# Patient Record
Sex: FEMALE | Race: WHITE | Employment: FULL TIME | URBAN - METROPOLITAN AREA
[De-identification: names, ages, dates, MRNs, and addresses within clinical notes are randomized per-mention and may not be internally consistent; named-entity substitution may affect disease eponyms.]

---

## 2017-07-10 ENCOUNTER — HOSPITAL ENCOUNTER (OUTPATIENT)
Dept: MAMMOGRAPHY | Age: 57
Discharge: HOME | End: 2017-07-10
Attending: OBSTETRICS & GYNECOLOGY
Payer: COMMERCIAL

## 2017-07-10 DIAGNOSIS — Z12.31 VISIT FOR SCREENING MAMMOGRAM: ICD-10-CM

## 2017-07-10 PROCEDURE — 77067 SCR MAMMO BI INCL CAD: CPT

## 2018-07-18 ENCOUNTER — HOSPITAL ENCOUNTER (OUTPATIENT)
Dept: MAMMOGRAPHY | Age: 58
Discharge: HOME | End: 2018-07-18
Attending: FAMILY MEDICINE
Payer: COMMERCIAL

## 2018-07-18 DIAGNOSIS — Z12.31 VISIT FOR SCREENING MAMMOGRAM: ICD-10-CM

## 2018-07-18 PROCEDURE — 77063 BREAST TOMOSYNTHESIS BI: CPT

## 2018-07-20 ENCOUNTER — HOSPITAL ENCOUNTER (OUTPATIENT)
Dept: MAMMOGRAPHY | Age: 58
Discharge: HOME | End: 2018-07-20
Attending: FAMILY MEDICINE
Payer: COMMERCIAL

## 2018-07-20 DIAGNOSIS — R92.8 ABNORMAL MAMMOGRAM OF LEFT BREAST: ICD-10-CM

## 2018-07-20 PROCEDURE — 77065 DX MAMMO INCL CAD UNI: CPT

## 2018-07-20 NOTE — PROGRESS NOTES
I spoke with Walter Jones at Dr Carvajal Many office, ok to do STBX here and she will fax order.    rickyw

## 2018-07-25 ENCOUNTER — HOSPITAL ENCOUNTER (OUTPATIENT)
Dept: MAMMOGRAPHY | Age: 58
Discharge: HOME | End: 2018-07-25
Attending: FAMILY MEDICINE
Payer: COMMERCIAL

## 2018-07-25 DIAGNOSIS — R92.1 BREAST CALCIFICATIONS ON MAMMOGRAM: ICD-10-CM

## 2018-07-25 PROCEDURE — 88305 TISSUE EXAM BY PATHOLOGIST: CPT | Performed by: FAMILY MEDICINE

## 2018-07-25 PROCEDURE — A4648 IMPLANTABLE TISSUE MARKER: HCPCS

## 2018-07-25 PROCEDURE — 19081 BX BREAST 1ST LESION STRTCTC: CPT

## 2018-07-25 PROCEDURE — 77065 DX MAMMO INCL CAD UNI: CPT

## 2018-07-25 PROCEDURE — 74011000250 HC RX REV CODE- 250: Performed by: RADIOLOGY

## 2018-07-25 RX ORDER — LIDOCAINE HYDROCHLORIDE AND EPINEPHRINE 10; 10 MG/ML; UG/ML
10 INJECTION, SOLUTION INFILTRATION; PERINEURAL ONCE
Status: COMPLETED | OUTPATIENT
Start: 2018-07-25 | End: 2018-07-25

## 2018-07-25 RX ORDER — LIDOCAINE HYDROCHLORIDE 10 MG/ML
15 INJECTION, SOLUTION EPIDURAL; INFILTRATION; INTRACAUDAL; PERINEURAL ONCE
Status: COMPLETED | OUTPATIENT
Start: 2018-07-25 | End: 2018-07-25

## 2018-07-25 RX ADMIN — LIDOCAINE HYDROCHLORIDE AND EPINEPHRINE 100 MG: 10; 10 INJECTION, SOLUTION INFILTRATION; PERINEURAL at 09:00

## 2018-07-25 RX ADMIN — LIDOCAINE HYDROCHLORIDE 15 ML: 10 INJECTION, SOLUTION EPIDURAL; INFILTRATION; INTRACAUDAL; PERINEURAL at 09:00

## 2018-07-25 NOTE — DISCHARGE INSTRUCTIONS
Breast Biopsy Discharge Instructions    PAIN CONTROL     You may have mild discomfort; take 1-2 Tylenol every 4-6 hours as needed.  Do not take aspirin containing products or anti-inflammatory medications (Advil, Aleve, Motrin, Ibuprofen, etc.) for 24 hours.  Wearing a comfortable bra for support may help with discomfort. WOUND CARE      A small amount of bleeding or bruising at the biopsy site is normal. Watch for signs and symptoms of infections: skin warm to touch, yellowish drainage from wound, fever or severe pain.  Place an ice pack over the site hourly, 20-30 at a time for a few hours today.  The clear dressing is water resistant (you may shower, but do not allow the dressing to become saturated). You may remove the dressing in 48 hours. The steri-strips (small pieces of tape covering the incision) will fall off on their own in a few days. If the clear dressing irritates your skin or begins to peel off, you may remove it. Remember, if you remove the clear dressing before 48 hours, you should not get the site wet.  If at any point you have EXCESSIVE BLEEDING (saturating the gauze under the clear dressing) OR pain please call:    Daytime 7:30am-5:00pm: [facility] [phone]    After Hours:    [phone] (ask to speak to a radiologist)              or [address] [place] [address] [phone]    ACTIVITY     Do not participate in any strenuous activities for 24 hours (exercise, sports, housework, etc.).  You may resume work (light duty only) for the first 24 hours.  No heavy lifting with the arm on the affected side of your body. ADDITIONAL INSTRUCTIONS    We will call you with results on Friday July 27 in the afternoon.        YOUR TREATMENT TEAM TODAY WAS    MD: Ashley Jonas  RN: Guillermo Oh  Radiology Tech: [facility]

## 2018-07-25 NOTE — PROGRESS NOTES
Patient tolerated left breast biopsy well with minimal bleeding. Biopsy site was bandaged in the usual fashion and discharge instructions were reviewed with the patient. Advised patient that pathology results should be available by Friday and that she will receive a phone call in the afternoon from our office. Encouraged patient to call with any questions or concerns.

## 2018-07-30 NOTE — PROGRESS NOTES
7/27/18 LEFT breast pathology results were approved by Dr. Lanette Munguia and given to the patient via phone. The results were called to Dr. Sarahi Duran and Dr. Leticia Britt and they recommend Dr. Ashley Prince as a surgical consult. Pt will see Dr. Ashley Prince on Wednesday 7/6/1791 @ 2:40pm. Dr. Bhaskar Good office stated the patient was responsible for getting insurance auth for her appt, pt was informed of this. Adv pt to call us if she has any questions, she reported no complications from the procedure.     Jennifer Hernandez, RT, R, M

## 2018-08-01 ENCOUNTER — DOCUMENTATION ONLY (OUTPATIENT)
Dept: SURGERY | Age: 58
End: 2018-08-01

## 2018-08-01 ENCOUNTER — OFFICE VISIT (OUTPATIENT)
Dept: SURGERY | Age: 58
End: 2018-08-01

## 2018-08-01 VITALS
WEIGHT: 141 LBS | HEART RATE: 76 BPM | BODY MASS INDEX: 28.43 KG/M2 | SYSTOLIC BLOOD PRESSURE: 153 MMHG | DIASTOLIC BLOOD PRESSURE: 81 MMHG | HEIGHT: 59 IN

## 2018-08-01 DIAGNOSIS — Z91.89 AT HIGH RISK FOR BREAST CANCER: ICD-10-CM

## 2018-08-01 DIAGNOSIS — N60.92 ATYPICAL DUCTAL HYPERPLASIA OF LEFT BREAST: Primary | ICD-10-CM

## 2018-08-01 RX ORDER — ESTRADIOL AND NORETHINDRONE ACETATE .5; .1 MG/1; MG/1
TABLET ORAL
Refills: 1 | COMMUNITY
Start: 2018-07-03 | End: 2021-01-06 | Stop reason: ALTCHOICE

## 2018-08-01 RX ORDER — ROSUVASTATIN CALCIUM 10 MG/1
TABLET, COATED ORAL
COMMUNITY
Start: 2018-07-29

## 2018-08-01 RX ORDER — LISINOPRIL AND HYDROCHLOROTHIAZIDE 20; 25 MG/1; MG/1
TABLET ORAL
Refills: 1 | COMMUNITY
Start: 2018-06-01

## 2018-08-01 NOTE — PROGRESS NOTES
I spk w/ Dr. Irene Flores about the surgical recommendation, she agrees with the patient seeing Dr. Debbie Lerma. I advised her the patient has an appt today to see him.

## 2018-08-01 NOTE — PROGRESS NOTES
HISTORY OF PRESENT ILLNESS Amina Plunkett is a 66 y.o. female. HPI    NEW patient presents for consultation at the request of Dr. Tessa Camp for new diagnosis of LEFT breast ADH diagnosed after calcifications were seen on her recent mammogram.  The patient is not having any breast symptoms, feels no breast lumps, has no nipple discharge/retraction or skin change. Has only had one call back in the past for an abnormal mammogram.  This was the patient's first biopsy. There is no FH of breast or ovarian cancer. 7/25/18:  Left breast, core biopsies Focal atypical ductal hyperplasia (ADH) Benign breast tissue with fibrocystic changes and associated microcalcifications Recent imaging has been at [address] [place] [address] Review of Systems Constitutional: Negative. HENT: Negative. Eyes: Negative. Respiratory: Negative. Cardiovascular: Negative. Gastrointestinal: Negative. Genitourinary: Negative. Musculoskeletal: Positive for joint pain. Skin: Negative. Neurological: Negative. Endo/Heme/Allergies: Negative. Psychiatric/Behavioral: Positive for depression. Physical Exam 
 
ASSESSMENT and PLAN 
{ASSESSMENT/PLAN:72571}

## 2018-08-01 NOTE — LETTER
8/1/2018 4:24 PM 
 
Patient:  Keiko Coleman YOB: 1960 Date of Visit: 8/1/2018 Dear Dr. Annmarie Johnson: Thank you for referring Ms. Keiko Coleman to me for evaluation/treatment. Below are the relevant portions of my assessment and plan of care. HISTORY OF PRESENT ILLNESS Keiko Coleman is a 66 y.o. female. HPI 
NEW patient presents for consultation at the request of Dr. Suzanna Puente for new diagnosis of LEFT breast ADH diagnosed after calcifications were seen on her recent mammogram.  The patient is not having any breast symptoms, feels no breast lumps, has no nipple discharge/retraction or skin change. Has only had one call back in the past for an abnormal mammogram.  This was the patient's first biopsy. There is no FH of breast or ovarian cancer. 7/25/18:  Left breast, core biopsies Focal atypical ductal hyperplasia (ADH) Benign breast tissue with fibrocystic changes and associated microcalcifications Recent imaging has been at [address] [place] [address] Past Medical History:  
Diagnosis Date  Hypertension Past Surgical History:  
Procedure Laterality Date  HX GYN Left Removal of fibroid tumor from ovary Social History Social History  Marital status:  Spouse name: N/A  
 Number of children: N/A  
 Years of education: N/A Occupational History  Not on file. Social History Main Topics  Smoking status: Former Smoker  Smokeless tobacco: Never Used  Alcohol use Yes  Drug use: No  
 Sexual activity: Not on file Other Topics Concern  Not on file Social History Narrative Current Outpatient Prescriptions on File Prior to Visit Medication Sig Dispense Refill  fluoxetine (PROZAC) 20 mg capsule Take 20 mg by mouth daily.  aspirin 81 mg chewable tablet Take 81 mg by mouth daily. No current facility-administered medications on file prior to visit. Allergies Allergen Reactions  Erythromycin Estolate Hives OB History Obstetric Comments Menarche 17, LMP 7/23/18, # of children 2, age of 4st delivery 41, Hysterectomy/oophorectomy No/No/, Breast bx Yes, LEFT, history of breast feeding Yes, BCP Yes, Hormone therapy Yes, currently ROS Constitutional: Negative. HENT: Negative. Eyes: Negative. Respiratory: Negative. Cardiovascular: Negative. Gastrointestinal: Negative. Genitourinary: Negative. Musculoskeletal: Positive for joint pain. Skin: Negative. Neurological: Negative. Endo/Heme/Allergies: Negative. Psychiatric/Behavioral: Positive for depression. Physical Exam  
Cardiovascular: Normal rate, regular rhythm and normal heart sounds. Pulmonary/Chest: Breath sounds normal. Right breast exhibits no inverted nipple, no mass, no nipple discharge, no skin change and no tenderness. Left breast exhibits no inverted nipple, no mass, no nipple discharge, no skin change and no tenderness. Breasts are symmetrical.  
 
 
Lymphadenopathy:  
     Right cervical: No superficial cervical, no deep cervical and no posterior cervical adenopathy present. Left cervical: No superficial cervical, no deep cervical and no posterior cervical adenopathy present. She has no axillary adenopathy. Right axillary: No pectoral and no lateral adenopathy present. Left axillary: No pectoral and no lateral adenopathy present. BREAST ULTRASOUND, Preop planning Indication:preop planning  left Breast upper outer quadrant Technique: The area was scanned using a high-frequency linear-array near-field transducer Findings: Bx clip visible Impression: ADH, per bx Disposition:  MRI 
 
ASSESSMENT and PLAN 
  ICD-10-CM ICD-9-CM 1. Atypical ductal hyperplasia of left breast N60.92 610.8 New pt presents for evaluation of LEFT breast ADH.  Reviewed images and discussed possibility of concomitant breast cancer. Discussed that ADH increases risk of breast cancer, and will qualify pt for enrollment in our high risk clinic. I recommend MRI, followed by possible excisional bx to confirm PATH pending MRI results. Also discussed continued observation with imaging. Discussed potential treatment for DCIS, including lumpectomy and possible XRT. Physical exam today normal. US visualizes bx clip at LEFT breast UOQ. Will order MRI and plan further form there. I will prescribe Xanax prior to MRI, as pt notes claustrophobia . Will f/u with MRI results. This plan was reviewed with the patient and patient agrees. All questions were answered. Written by Gino Officer, as dictated by Dr. Gini Viera MD. 
 
If you have questions, please do not hesitate to call me. I look forward to following Ms. DanielrickyEric along with you.  
 
 
 
Sincerely, 
 
 
Amarjit George MD

## 2018-08-01 NOTE — COMMUNICATION BODY
HISTORY OF PRESENT ILLNESS  Andreas Sprague is a 66 y.o. female. HPI  NEW patient presents for consultation at the request of Dr. Ryan Arce for new diagnosis of LEFT breast ADH diagnosed after calcifications were seen on her recent mammogram.  The patient is not having any breast symptoms, feels no breast lumps, has no nipple discharge/retraction or skin change. Has only had one call back in the past for an abnormal mammogram.  This was the patient's first biopsy. There is no FH of breast or ovarian cancer. 7/25/18:  Left breast, core biopsies   Focal atypical ductal hyperplasia (ADH)   Benign breast tissue with fibrocystic changes and associated microcalcifications     Recent imaging has been at [facility]    Past Medical History:   Diagnosis Date    Hypertension        Past Surgical History:   Procedure Laterality Date    HX GYN Left     Removal of fibroid tumor from ovary        Social History     Social History    Marital status:      Spouse name: N/A    Number of children: N/A    Years of education: N/A     Occupational History    Not on file. Social History Main Topics    Smoking status: Former Smoker    Smokeless tobacco: Never Used    Alcohol use Yes    Drug use: No    Sexual activity: Not on file     Other Topics Concern    Not on file     Social History Narrative       Current Outpatient Prescriptions on File Prior to Visit   Medication Sig Dispense Refill    fluoxetine (PROZAC) 20 mg capsule Take 20 mg by mouth daily.  aspirin 81 mg chewable tablet Take 81 mg by mouth daily. No current facility-administered medications on file prior to visit.         Allergies   Allergen Reactions    Erythromycin Estolate Hives       OB History     Obstetric Comments    Menarche 17, LMP 7/23/18, # of children 2, age of 4st delivery 41, Hysterectomy/oophorectomy No/No/, Breast bx Yes, LEFT, history of breast feeding Yes, BCP Yes, Hormone therapy Yes, currently          ROS  Constitutional: Negative. HENT: Negative. Eyes: Negative. Respiratory: Negative. Cardiovascular: Negative. Gastrointestinal: Negative. Genitourinary: Negative. Musculoskeletal: Positive for joint pain. Skin: Negative. Neurological: Negative. Endo/Heme/Allergies: Negative. Psychiatric/Behavioral: Positive for depression. Physical Exam   Cardiovascular: Normal rate, regular rhythm and normal heart sounds. Pulmonary/Chest: Breath sounds normal. Right breast exhibits no inverted nipple, no mass, no nipple discharge, no skin change and no tenderness. Left breast exhibits no inverted nipple, no mass, no nipple discharge, no skin change and no tenderness. Breasts are symmetrical.       Lymphadenopathy:        Right cervical: No superficial cervical, no deep cervical and no posterior cervical adenopathy present. Left cervical: No superficial cervical, no deep cervical and no posterior cervical adenopathy present. She has no axillary adenopathy. Right axillary: No pectoral and no lateral adenopathy present. Left axillary: No pectoral and no lateral adenopathy present. BREAST ULTRASOUND, Preop planning  Indication:preop planning  left Breast upper outer quadrant   Technique: The area was scanned using a high-frequency linear-array near-field transducer  Findings: Bx clip visible  Impression: ADH, per bx  Disposition:  MRI    ASSESSMENT and PLAN    ICD-10-CM ICD-9-CM    1. Atypical ductal hyperplasia of left breast N60.92 610.8       New pt presents for evaluation of LEFT breast ADH. Reviewed images and discussed possibility of concomitant breast cancer. Discussed that ADH increases risk of breast cancer, and will qualify pt for enrollment in our high risk clinic. I recommend MRI, followed by possible excisional bx to confirm PATH pending MRI results. Also discussed continued observation with imaging.  Discussed potential treatment for DCIS, including lumpectomy and possible XRT. Physical exam today normal. US visualizes bx clip at LEFT breast UOQ. Will order MRI and plan further form there. I will prescribe Xanax prior to MRI, as pt notes claustrophobia . Will f/u with MRI results. This plan was reviewed with the patient and patient agrees. All questions were answered.     Written by Emily Salazar, as dictated by Dr. Romie Garces MD.

## 2018-08-01 NOTE — PROGRESS NOTES
HISTORY OF PRESENT ILLNESS  Amina Plunkett is a 66 y.o. female. HPI  NEW patient presents for consultation at the request of Dr. Tessa Camp for new diagnosis of LEFT breast ADH diagnosed after calcifications were seen on her recent mammogram.  The patient is not having any breast symptoms, feels no breast lumps, has no nipple discharge/retraction or skin change. Has only had one call back in the past for an abnormal mammogram.  This was the patient's first biopsy. There is no FH of breast or ovarian cancer. 7/25/18:  Left breast, core biopsies   Focal atypical ductal hyperplasia (ADH)   Benign breast tissue with fibrocystic changes and associated microcalcifications     Recent imaging has been at [facility]    Past Medical History:   Diagnosis Date    Hypertension        Past Surgical History:   Procedure Laterality Date    HX GYN Left     Removal of fibroid tumor from ovary        Social History     Social History    Marital status:      Spouse name: N/A    Number of children: N/A    Years of education: N/A     Occupational History    Not on file. Social History Main Topics    Smoking status: Former Smoker    Smokeless tobacco: Never Used    Alcohol use Yes    Drug use: No    Sexual activity: Not on file     Other Topics Concern    Not on file     Social History Narrative       Current Outpatient Prescriptions on File Prior to Visit   Medication Sig Dispense Refill    fluoxetine (PROZAC) 20 mg capsule Take 20 mg by mouth daily.  aspirin 81 mg chewable tablet Take 81 mg by mouth daily. No current facility-administered medications on file prior to visit.         Allergies   Allergen Reactions    Erythromycin Estolate Hives       OB History     Obstetric Comments    Menarche 17, LMP 7/23/18, # of children 2, age of 4st delivery 41, Hysterectomy/oophorectomy No/No/, Breast bx Yes, LEFT, history of breast feeding Yes, BCP Yes, Hormone therapy Yes, currently          ROS  Constitutional: Negative. HENT: Negative. Eyes: Negative. Respiratory: Negative. Cardiovascular: Negative. Gastrointestinal: Negative. Genitourinary: Negative. Musculoskeletal: Positive for joint pain. Skin: Negative. Neurological: Negative. Endo/Heme/Allergies: Negative. Psychiatric/Behavioral: Positive for depression. Physical Exam   Cardiovascular: Normal rate, regular rhythm and normal heart sounds. Pulmonary/Chest: Breath sounds normal. Right breast exhibits no inverted nipple, no mass, no nipple discharge, no skin change and no tenderness. Left breast exhibits no inverted nipple, no mass, no nipple discharge, no skin change and no tenderness. Breasts are symmetrical.       Lymphadenopathy:        Right cervical: No superficial cervical, no deep cervical and no posterior cervical adenopathy present. Left cervical: No superficial cervical, no deep cervical and no posterior cervical adenopathy present. She has no axillary adenopathy. Right axillary: No pectoral and no lateral adenopathy present. Left axillary: No pectoral and no lateral adenopathy present. BREAST ULTRASOUND, Preop planning  Indication:preop planning  left Breast upper outer quadrant   Technique: The area was scanned using a high-frequency linear-array near-field transducer  Findings: Bx clip visible  Impression: ADH, per bx  Disposition:  MRI    ASSESSMENT and PLAN    ICD-10-CM ICD-9-CM    1. Atypical ductal hyperplasia of left breast N60.92 610.8       New pt presents for evaluation of LEFT breast ADH. Reviewed images and discussed possibility of concomitant breast cancer. Discussed that ADH increases risk of breast cancer, and will qualify pt for enrollment in our high risk clinic. I recommend MRI, followed by possible excisional bx to confirm PATH pending MRI results. Also discussed continued observation with imaging.  Discussed potential treatment for DCIS, including lumpectomy and possible XRT. Physical exam today normal. US visualizes bx clip at LEFT breast UOQ. Will order MRI and plan further form there. I will prescribe Xanax prior to MRI, as pt notes claustrophobia . Will f/u with MRI results. This plan was reviewed with the patient and patient agrees. All questions were answered.     Written by Shiloh Pereyra, as dictated by Dr. Shayla Billy MD.

## 2018-08-01 NOTE — PATIENT INSTRUCTIONS
Open Breast Biopsy: Before Your Surgery  What is an open breast biopsy? An open breast biopsy is surgery to take a sample of breast tissue. It may be done to check a lump found during a breast exam. Or it may be done to check an area of concern found on a mammogram or ultrasound. If the doctor can't feel the lump, a small wire can be put in the area during a mammogram or ultrasound just before surgery. The tip of the wire will guide the doctor to the area to be checked. The doctor will make a small cut in the breast to remove a piece of tissue. The cut is called an incision. If the lump or suspicious area is small, the doctor may be able to take out the entire lump or area. The doctor will close the incision with stitches. The breast tissue will be sent to a lab. There it will be examined under a microscope to check for breast cancer. Your doctor may get some answers right away. But it can take up to 1 to 2 weeks to get the final results. You will be able to go home on the same day as the biopsy. Most women are able to go back to work in 1 or 2 days. This depends on how you feel and the type of work you do. For 2 weeks after surgery, you will need to avoid bouncing and strenuous activities that involve the upper body. The surgery will leave a small scar on your breast that will fade with time. Less often, the surgery may leave a dent in the breast. You may be able to feel a hard area where the biopsy was done. This is a normal part of the healing process. It does not mean that the lump is growing back. The area will get softer in the weeks after surgery. Follow-up care is a key part of your treatment and safety. Be sure to make and go to all appointments, and call your doctor if you are having problems. It's also a good idea to know your test results and keep a list of the medicines you take. What happens before surgery?   Surgery can be stressful.  This information will help you understand what you can expect. And it will help you safely prepare for surgery.   Preparing for surgery    · Understand exactly what surgery is planned, along with the risks, benefits, and other options. · Tell your doctors ALL the medicines, vitamins, supplements, and herbal remedies you take. Some of these can increase the risk of bleeding or interact with anesthesia.     · If you take blood thinners, such as warfarin (Coumadin), clopidogrel (Plavix), or aspirin, be sure to talk to your doctor. He or she will tell you if you should stop taking these medicines before your surgery. Make sure that you understand exactly what your doctor wants you to do.     · Your doctor will tell you which medicines to take or stop before your surgery. You may need to stop taking certain medicines a week or more before surgery. So talk to your doctor as soon as you can.     · If you have an advance directive, let your doctor know. It may include a living will and a durable power of  for health care. Bring a copy to the hospital. If you don't have one, you may want to prepare one. It lets your doctor and loved ones know your health care wishes. Doctors advise that everyone prepare these papers before any type of surgery or procedure. What happens on the day of surgery? · Follow the instructions exactly about when to stop eating and drinking. If you don't, your surgery may be canceled. If your doctor told you to take your medicines on the day of surgery, take them with only a sip of water.     · Take a bath or shower before you come in for your surgery. Do not apply lotions, perfumes, deodorants, or nail polish.     · Do not shave the surgical site yourself.     · Take off all jewelry and piercings. And take out contact lenses, if you wear them.     · Bring a comfortable, supportive bra with you.  For the first 3 days after surgery, you will need to wear this all the time, even at night.    At the hospital or surgery center   · Bring a picture ID.     · The area for surgery is often marked to make sure there are no errors. If needed, you will get a mammogram or ultrasound to marjan the area.     · You will be kept comfortable and safe by your anesthesia provider. The anesthesia may make you sleep. Or it may just numb the area being worked on.     · The surgery will take about 1 hour. Going home   · Be sure you have someone to drive you home. Anesthesia and pain medicine make it unsafe for you to drive.     · You will be given more specific instructions about recovering from your surgery. They will cover things like diet, wound care, follow-up care, driving, and getting back to your normal routine. When should you call your doctor? · You have questions or concerns.     · You don't understand how to prepare for your surgery.     · You become ill before the surgery (such as fever, flu, or a cold).     · You need to reschedule or have changed your mind about having the surgery. Where can you learn more? Go to http://golden-isidoro.info/. Enter O436 in the search box to learn more about \"Open Breast Biopsy: Before Your Surgery. \"  Current as of: May 12, 2017  Content Version: 11.7  © 7481-1760 [employer]. Care instructions adapted under license by [employer] (which disclaims liability or warranty for this information). If you have questions about a medical condition or this instruction, always ask your healthcare professional. [address] any warranty or liability for your use of this information.

## 2018-08-01 NOTE — MR AVS SNAPSHOT
[address] [address] [address] [place] [address] 
[phone] Patient: Luz Mancuso MRN: [id number] ZLA:[id number] Visit Information Date & Time Provider Department Dept. Phone Encounter #  
 8/1/2018  3:56 PM Lawrence Yuan MD [address] at [facility] [phone] Upcoming Health Maintenance Date Due Hepatitis C Screening 1960 DTaP/Tdap/Td series (1 - Tdap) 1/18/1983 PAP AKA CERVICAL CYTOLOGY 1/18/1983 FOBT Q 1 YEAR AGE 50-75 1/18/2012 Influenza Age 5 to Adult 8/1/2018 BREAST CANCER SCRN MAMMOGRAM 7/20/2020 Allergies as of 8/1/2018  Review Complete On: 8/1/2018 By: Beatriz Cowan RN Severity Noted Reaction Type Reactions Erythromycin Estolate  12/23/2010    Hives Current Immunizations  Never Reviewed No immunizations on file. Not reviewed this visit Vitals BP Pulse Height(growth percentile) Weight(growth percentile) LMP BMI  
 153/81 76 4' 11\" (1.499 m) 141 lb (64 kg) 07/23/2018 28.48 kg/m2 OB Status Smoking Status Having regular periods Former Smoker Vitals History BMI and BSA Data Body Mass Index Body Surface Area  
 28.48 kg/m 2 1.63 m 2 Your Updated Medication List  
  
   
This list is accurate as of 8/1/18  3:36 PM.  Always use your most recent med list.  
  
  
  
  
 aspirin 81 mg chewable tablet Take 81 mg by mouth daily. estradiol-norethindrone 0.5-0.1 mg per tablet Commonly known as:  [place] Jack TAKE 1 TABLET BY MOUTH EVERY DAY FLUoxetine 20 mg capsule Commonly known as:  PROzac Take 20 mg by mouth daily. IRON PO Take  by mouth. KLOR-CON 10 PO Take  by mouth.  
  
 lisinopril-hydroCHLOROthiazide 20-25 mg per tablet Commonly known as:  PRINZIDE, ZESTORETIC  
TAKE 1 TABLET BY MOUTH EVERY DAY  
  
 rosuvastatin 10 mg tablet Commonly known as:  CRESTOR  
 Introducing [facility] & HEALTH SERVICES! [facility] introduces [employer] patient portal. Now you can access parts of your medical record, email your doctor's office, and request medication refills online. 1. In your internet browser, go to [email or web address] Vanilla [email or web address]/[employer] 2. Click on the First Time User? Click Here link in the Sign In box. You will see the New Member Sign Up page. 3. Enter your [employer] Access Code exactly as it appears below. You will not need to use this code after youve completed the sign-up process. If you do not sign up before the expiration date, you must request a new code. · [employer] Access Code: [email or web address] Expires: 9/19/2018  4:51 PM 
 
4. Enter the last four digits of your Social Security Number (xxxx) and Date of Birth (mm/dd/yyyy) as indicated and click Submit. You will be taken to the next sign-up page. 5. Create a [employer] ID. This will be your [employer] login ID and cannot be changed, so think of one that is secure and easy to remember. 6. Create a [employer] password. You can change your password at any time. 7. Enter your Password Reset Question and Answer. This can be used at a later time if you forget your password. 8. Enter your e-mail address. You will receive e-mail notification when new information is available in [address] 9. Click Sign Up. You can now view and download portions of your medical record. 10. Click the Download Summary menu link to download a portable copy of your medical information. If you have questions, please visit the Frequently Asked Questions section of the [employer] website. Remember, [employer] is NOT to be used for urgent needs. For medical emergencies, dial 911. Now available from your iPhone and Android! Please provide this summary of care documentation to your next provider. Your primary care clinician is listed as Marah Saxena. If you have any questions after today's visit, please call [phone].

## 2018-08-06 ENCOUNTER — TELEPHONE (OUTPATIENT)
Dept: SURGERY | Age: 58
End: 2018-08-06

## 2018-08-06 ENCOUNTER — DOCUMENTATION ONLY (OUTPATIENT)
Dept: SURGERY | Age: 58
End: 2018-08-06

## 2018-08-06 ENCOUNTER — HOSPITAL ENCOUNTER (OUTPATIENT)
Dept: MRI IMAGING | Age: 58
Discharge: HOME | End: 2018-08-06
Attending: SURGERY
Payer: COMMERCIAL

## 2018-08-06 VITALS — WEIGHT: 144 LBS | BODY MASS INDEX: 29.08 KG/M2

## 2018-08-06 DIAGNOSIS — F41.9 ANXIETY: Primary | ICD-10-CM

## 2018-08-06 DIAGNOSIS — N60.92 ATYPICAL DUCTAL HYPERPLASIA OF LEFT BREAST: ICD-10-CM

## 2018-08-06 DIAGNOSIS — Z91.89 AT HIGH RISK FOR BREAST CANCER: ICD-10-CM

## 2018-08-06 LAB — CREAT BLD-MCNC: 0.6 MG/DL (ref 0.6–1.3)

## 2018-08-06 PROCEDURE — 74011250636 HC RX REV CODE- 250/636: Performed by: SURGERY

## 2018-08-06 PROCEDURE — 82565 ASSAY OF CREATININE: CPT

## 2018-08-06 PROCEDURE — A9585 GADOBUTROL INJECTION: HCPCS | Performed by: SURGERY

## 2018-08-06 PROCEDURE — 77059 MRI BREAST BI W WO CONT: CPT

## 2018-08-06 RX ORDER — ALPRAZOLAM 0.5 MG/1
TABLET ORAL
Qty: 5 TAB | Refills: 0 | OUTPATIENT
Start: 2018-08-06 | End: 2019-02-13

## 2018-08-06 RX ADMIN — GADOBUTROL 6.5 ML: 604.72 INJECTION INTRAVENOUS at 14:00

## 2018-08-06 NOTE — PROGRESS NOTES
Authorization for breast MRI today (Dr. Real Snow did a peer to peer) is [id number]. I left this number for Arthur Bone and Lisa Montalvo at [address] [place].   Apparently the wrong diagnosis was given for the test.

## 2018-08-06 NOTE — TELEPHONE ENCOUNTER
L/M on nurse voicemail that she needed sedation for her breast MRI today. Also is asking about whether or not her insurance has approved the MRI for today. I called patient back. I let her know that Dr. Cheyenne Heller did perform a peer to peer today and was able to obtain an authorization for her breast MRI. I also told her I would call in Xanax 0.5 mg #[facility] at [phone]. I told her to take one pill 30-45 minutes prior to her procedure. She asked about when she would get results, and I let her know that results would probably be ready tomorrow. She was very appreciative of the return phone call.

## 2018-08-07 ENCOUNTER — TELEPHONE (OUTPATIENT)
Dept: SURGERY | Age: 58
End: 2018-08-07

## 2018-08-09 ENCOUNTER — TELEPHONE (OUTPATIENT)
Dept: SURGERY | Age: 58
End: 2018-08-09

## 2018-08-09 NOTE — TELEPHONE ENCOUNTER
The patient called wanting to make sure that waiting 6 months to re- visit her MRI findings was acceptable. I assured her that Dr. Jing Humphrey had written for a 6 month follow up after discussing with the patient her MRI results. She also questioned if she should have imaging done prior to the appointment and I told the patient I would check with Dio Vaughn, Dr. Johanny Massey nurse as to what specific breast imaging should be ordered? The patient was appreciative.

## 2018-08-22 ENCOUNTER — OFFICE VISIT (OUTPATIENT)
Dept: SURGERY | Age: 58
End: 2018-08-22

## 2018-08-22 DIAGNOSIS — Z91.89 AT HIGH RISK FOR BREAST CANCER: ICD-10-CM

## 2018-08-22 DIAGNOSIS — N60.92 ATYPICAL DUCTAL HYPERPLASIA OF LEFT BREAST: Primary | ICD-10-CM

## 2018-08-22 NOTE — PROGRESS NOTES
HISTORY OF PRESENT ILLNESS  Henrietta Monique is a 66 y.o. female. HPI   ESTABLISHED patient here today for follow up. She was recently diagnosed with LEFT breast ADH. She had her breast MRI on 8/6/18. She is here because she has a few questions regarding diagnosis and follow up. She has no new breast problems at this time. There is no FH of breast or ovarian cancer. FINAL PATHOLOGIC DIAGNOSIS on 7/25/18-  Left breast, core biopsies   Focal atypical ductal hyperplasia (ADH)   Benign breast tissue with fibrocystic changes and associated microcalcifications     Recent imaging-  8/6/18- Breast MRI   FINDINGS:  The breasts demonstrate scattered fibroglandular densities. There is moderate  background parenchymal enhancement. Scattered enhancing foci are seen in both  breasts, left greater than right.     There is a Hydromark biopsy clip at the 3:00 position of the left breast; there  is an associated thin rim of peripheral nonmasslike enhancement. No associated  mass is evident. No other dominant mass lesion or suspicious nonmasslike  enhancement is seen in either breast. There are no pathologically enlarged  axillary or internal mammary lymph nodes.     Incidental note is made of a subcentimeter cyst in the left hepatic lobe.     IMPRESSION:  BI-RADS Assessment Category 2: Benign finding. The thin rim of peripheral  nonmasslike enhancement at the biopsy site in the left breast likely represents  postbiopsy changes; no associated suspicious mass is seen. Surgical management  of the known ADH is recommended. The asymmetric scattered enhancing foci in both  breasts are benign in appearance; this examination will serve as a baseline. Follow-up breast MRI in one year is recommended with the patient's high-risk  status.     ROS    Physical Exam    ASSESSMENT and PLAN  {ASSESSMENT/PLAN:95360}

## 2018-08-22 NOTE — PROGRESS NOTES
HISTORY OF PRESENT ILLNESS  Sheri Sahu is a 66 y.o. female. HPI  ESTABLISHED patient here today for follow up. She was recently diagnosed with LEFT breast ADH. She had her breast MRI on 8/6/18. She is here because she has a few questions regarding diagnosis and follow up. She has no new breast problems at this time.      There is no FH of breast or ovarian cancer.     FINAL PATHOLOGIC DIAGNOSIS on 7/25/18-  Left breast, core biopsies   Focal atypical ductal hyperplasia (ADH)   Benign breast tissue with fibrocystic changes and associated microcalcifications      Recent imaging-  8/6/18- Breast MRI   FINDINGS:  The breasts demonstrate scattered fibroglandular densities. There is moderate  background parenchymal enhancement. Scattered enhancing foci are seen in both  breasts, left greater than right.      There is a Hydromark biopsy clip at the 3:00 position of the left breast; there  is an associated thin rim of peripheral nonmasslike enhancement. No associated  mass is evident. No other dominant mass lesion or suspicious nonmasslike  enhancement is seen in either breast. There are no pathologically enlarged  axillary or internal mammary lymph nodes.      Incidental note is made of a subcentimeter cyst in the left hepatic lobe.      IMPRESSION:  BI-RADS Assessment Category 2: Benign finding. The thin rim of peripheral  nonmasslike enhancement at the biopsy site in the left breast likely represents  postbiopsy changes; no associated suspicious mass is seen. Surgical management  of the known ADH is recommended. The asymmetric scattered enhancing foci in both  breasts are benign in appearance; this examination will serve as a baseline. Follow-up breast MRI in one year is recommended with the patient's high-risk  Status.     Past Medical History:   Diagnosis Date    Hypertension        Past Surgical History:   Procedure Laterality Date    HX GYN Left     Removal of fibroid tumor from ovary        Social History     Social History    Marital status:      Spouse name: N/A    Number of children: N/A    Years of education: N/A     Occupational History    Not on file. Social History Main Topics    Smoking status: Former Smoker    Smokeless tobacco: Never Used    Alcohol use Yes    Drug use: No    Sexual activity: Not on file     Other Topics Concern    Not on file     Social History Narrative       Current Outpatient Prescriptions on File Prior to Visit   Medication Sig Dispense Refill    ALPRAZolam (XANAX) 0.5 mg tablet Take one tab 30-45 mins prior to procedure. 5 Tab 0    estradiol-norethindrone (ACTIVELLA) 0.5-0.1 mg per tablet TAKE 1 TABLET BY MOUTH EVERY DAY  1    lisinopril-hydroCHLOROthiazide (PRINZIDE, ZESTORETIC) 20-25 mg per tablet TAKE 1 TABLET BY MOUTH EVERY DAY  1    rosuvastatin (CRESTOR) 10 mg tablet       potassium chloride (KLOR-CON 10 PO) Take  by mouth.  ferrous sulfate (IRON PO) Take  by mouth.  fluoxetine (PROZAC) 20 mg capsule Take 20 mg by mouth daily.  aspirin 81 mg chewable tablet Take 81 mg by mouth daily. No current facility-administered medications on file prior to visit. Allergies   Allergen Reactions    Erythromycin Estolate Hives       OB History     Obstetric Comments    Menarche 17, LMP 7/23/18, # of children 2, age of 4st delivery 41, Hysterectomy/oophorectomy No/No/, Breast bx Yes, LEFT, history of breast feeding Yes, BCP Yes, Hormone therapy Yes, currently          ROS    Physical Exam     ASSESSMENT and PLAN    ICD-10-CM ICD-9-CM    1. Atypical ductal hyperplasia of left breast N60.92 610.8    2. At high risk for breast cancer Z91.89 V49.89       Pt with LEFT breast ADH presents for f/u after breast MRI, and is doing well overall. She is anxious about possible surgery, and has many questions to discuss today.  Reviewed MRI results, which demonstrated normal enhancement, with bx clip visible in bx cavity, with thin rim of enhancement around bx cavity. Reviewed that this is normal. No mass or malignant type enhancement around clip. Further observation would not be unwarranted if pt is opposed to surgery. However, we also discussed that although MRI does not suggest any malignant findings, the safest thing to do would be excision, to be sure that there is not a concomitant breast cancer at bx site. Pt notes that she is anxious about surgery and anesthesia - discussed that we could use sedation rather than GA. Reviewed that excisional bx would entail sedation, and removal of small area of tissue around bx clip. Assured pt that excisional bx is a low risk procedure, and will verify PATH. Pt notes that her mother had liposarcoma. Reviewed that these are rare and are not genetic, and pt is at low risk for this despite her mother's hx. I will place order for excisional bx, and note that pt may call to schedule. Advised pt to have LEFT diagnostic mammo in 6 months, and also encouraged her to begin routine colonoscopies, as she notes she has not had one yet. This plan was reviewed with the patient and patient agrees. All questions were answered.     Written by Doe Calvo, as dictated by Dr. Secundino Saint, MD.

## 2018-08-22 NOTE — MR AVS SNAPSHOT
[address] [address] [address] [place] [address] 
[phone] Patient: Darleen Aldana MRN: [id number] EFN:[id number] Visit Information Date & Time Provider Department Dept. Phone Encounter #  
 8/22/2018  7:25 AM Alicia Montoya MD [address] at [address] [place] [address] [id number] Your Appointments 2/6/2019  1:30 PM  
Follow Up with MD [address] at [facility] Appt Note: 6 month follow up/ CP? 8/9/18 [address]  
  
   
 [place] [address] Upcoming Health Maintenance Date Due Hepatitis C Screening 1960 DTaP/Tdap/Td series (1 - Tdap) 1/18/1983 PAP AKA CERVICAL CYTOLOGY 1/18/1983 FOBT Q 1 YEAR AGE 50-75 1/18/2012 Influenza Age 5 to Adult 8/1/2018 BREAST CANCER SCRN MAMMOGRAM 7/20/2020 Allergies as of 8/22/2018  Review Complete On: 8/22/2018 By: Tali Almanza RN Severity Noted Reaction Type Reactions Erythromycin Estolate  12/23/2010    Hives Current Immunizations  Never Reviewed No immunizations on file. Not reviewed this visit You Were Diagnosed With   
  
 Codes Comments Atypical ductal hyperplasia of left breast    -  Primary ICD-10-CM: N60.92 
ICD-9-CM: 610.8 At high risk for breast cancer     ICD-10-CM: Z91.89 ICD-9-CM: V49.89 Vitals LMP OB Status Smoking Status 07/23/2018 Having regular periods Former Smoker Preferred Pharmacy Pharmacy Name Phone [id number] [place] [address] [phone] Your Updated Medication List  
  
   
This list is accurate as of 8/22/18 12:22 PM.  Always use your most recent med list.  
  
  
  
  
 ALPRAZolam 0.5 mg tablet Commonly known as:  Mariama [place] Take one tab 30-45 mins prior to procedure. aspirin 81 mg chewable tablet Take 81 mg by mouth daily. estradiol-norethindrone 0.5-0.1 mg per tablet Commonly known as:  [place]l New TAKE 1 TABLET BY MOUTH EVERY DAY FLUoxetine 20 mg capsule Commonly known as:  PROzac Take 20 mg by mouth daily. IRON PO Take  by mouth. KLOR-CON 10 PO Take  by mouth.  
  
 lisinopril-hydroCHLOROthiazide 20-25 mg per tablet Commonly known as:  PRINZIDE, ZESTORETIC  
TAKE 1 TABLET BY MOUTH EVERY DAY  
  
 rosuvastatin 10 mg tablet Commonly known as:  CRESTOR Patient Instructions Breast Self-Exam: Care Instructions Your Care Instructions A breast self-exam is when you check your breasts for lumps or changes. This regular exam helps you learn how your breasts normally look and feel. Most breast problems or changes are not because of cancer. Breast self-exam is not a substitute for a mammogram. Having regular breast exams by your doctor and regular mammograms improve your chances of finding any problems with your breasts. Some women set a time each month to do a step-by-step breast self-exam. Other women like a less formal system. They might look at their breasts as they brush their teeth, or feel their breasts once in a while in the shower. If you notice a change in your breast, tell your doctor. Follow-up care is a key part of your treatment and safety. Be sure to make and go to all appointments, and call your doctor if you are having problems. It's also a good idea to know your test results and keep a list of the medicines you take. How do you do a breast self-exam? 
· The best time to examine your breasts is usually one week after your menstrual period begins. Your breasts should not be tender then. If you do not have periods, you might do your exam on a day of the month that is easy to remember. · To examine your breasts: ¨ Remove all your clothes above the waist and lie down. When you are lying down, your breast tissue spreads evenly over your chest wall, which makes it easier to feel all your breast tissue. ¨ Use the pads-not the fingertips-of the 3 middle fingers of your left hand to check your right breast. Move your fingers slowly in small coin-sized circles that overlap. ¨ Use three levels of pressure to feel of all your breast tissue. Use light pressure to feel the tissue close to the skin surface. Use medium pressure to feel a little deeper. Use firm pressure to feel your tissue close to your breastbone and ribs. Use each pressure level to feel your breast tissue before moving on to the next spot. ¨ Check your entire breast, moving up and down as if following a strip from the collarbone to the bra line, and from the armpit to the ribs. Repeat until you have covered the entire breast. 
¨ Repeat this procedure for your left breast, using the pads of the 3 middle fingers of your right hand. · To examine your breasts while in the shower: 
¨ Place one arm over your head and lightly soap your breast on that side. ¨ Using the pads of your fingers, gently move your hand over your breast (in the strip pattern described above), feeling carefully for any lumps or changes. ¨ Repeat for the other breast. 
· Have your doctor inspect anything you notice to see if you need further testing. Where can you learn more? Go to http://golden-isidoro.info/. Enter P148 in the search box to learn more about \"Breast Self-Exam: Care Instructions. \" Current as of: May 12, 2017 Content Version: 11.7 © 5433-8848 [employer]. Care instructions adapted under license by [employer] (which disclaims liability or warranty for this information).  If you have questions about a medical condition or this instruction, always ask your healthcare professional. [employer] disclaims any warranty or liability for your use of this information. Introducing [facility] & HE[facility] SERVICES! [employer] introduces [employer] patient portal. Now you can access parts of your medical record, email your doctor's office, and request medication refills online. 1. In your internet browser, go to [email or web address] 2. Click on the First Time User? Click Here link in the Sign In box. You will see the New Member Sign Up page. 3. Enter your [employer] Access Code exactly as it appears below. You will not need to use this code after youve completed the sign-up process. If you do not sign up before the expiration date, you must request a new code. · [employer] Access Code: [email or web address] Expires: 9/19/2018  4:51 PM 
 
4. Enter the last four digits of your Social Security Number (xxxx) and Date of Birth (mm/dd/yyyy) as indicated and click Submit. You will be taken to the next sign-up page. 5. Create a [employer] ID. This will be your [employer] login ID and cannot be changed, so think of one that is secure and easy to remember. 6. Create a [employer] password. You can change your password at any time. 7. Enter your Password Reset Question and Answer. This can be used at a later time if you forget your password. 8. Enter your e-mail address. You will receive e-mail notification when new information is available in [address] 9. Click Sign Up. You can now view and download portions of your medical record. 10. Click the Download Summary menu link to download a portable copy of your medical information. If you have questions, please visit the Frequently Asked Questions section of the [employer] website. Remember, [employer] is NOT to be used for urgent needs. For medical emergencies, dial 911. Now available from your iPhone and Android! Please provide this summary of care documentation to your next provider. Your primary care clinician is listed as Donald Colder. If you have any questions after today's visit, please call [phone].

## 2018-08-22 NOTE — PATIENT INSTRUCTIONS
Breast Self-Exam: Care Instructions  Your Care Instructions    A breast self-exam is when you check your breasts for lumps or changes. This regular exam helps you learn how your breasts normally look and feel. Most breast problems or changes are not because of cancer. Breast self-exam is not a substitute for a mammogram. Having regular breast exams by your doctor and regular mammograms improve your chances of finding any problems with your breasts. Some women set a time each month to do a step-by-step breast self-exam. Other women like a less formal system. They might look at their breasts as they brush their teeth, or feel their breasts once in a while in the shower. If you notice a change in your breast, tell your doctor. Follow-up care is a key part of your treatment and safety. Be sure to make and go to all appointments, and call your doctor if you are having problems. It's also a good idea to know your test results and keep a list of the medicines you take. How do you do a breast self-exam?  · The best time to examine your breasts is usually one week after your menstrual period begins. Your breasts should not be tender then. If you do not have periods, you might do your exam on a day of the month that is easy to remember. · To examine your breasts:  ¨ Remove all your clothes above the waist and lie down. When you are lying down, your breast tissue spreads evenly over your chest wall, which makes it easier to feel all your breast tissue. ¨ Use the pads-not the fingertips-of the 3 middle fingers of your left hand to check your right breast. Move your fingers slowly in small coin-sized circles that overlap. ¨ Use three levels of pressure to feel of all your breast tissue. Use light pressure to feel the tissue close to the skin surface. Use medium pressure to feel a little deeper. Use firm pressure to feel your tissue close to your breastbone and ribs.  Use each pressure level to feel your breast tissue before moving on to the next spot. ¨ Check your entire breast, moving up and down as if following a strip from the collarbone to the bra line, and from the armpit to the ribs. Repeat until you have covered the entire breast.  ¨ Repeat this procedure for your left breast, using the pads of the 3 middle fingers of your right hand. · To examine your breasts while in the shower:  ¨ Place one arm over your head and lightly soap your breast on that side. ¨ Using the pads of your fingers, gently move your hand over your breast (in the strip pattern described above), feeling carefully for any lumps or changes. ¨ Repeat for the other breast.  · Have your doctor inspect anything you notice to see if you need further testing. Where can you learn more? Go to http://golden-isidoro.info/. Enter P148 in the search box to learn more about \"Breast Self-Exam: Care Instructions. \"  Current as of: May 12, 2017  Content Version: 11.7  © 3539-7719 [employer]. Care instructions adapted under license by "[employer] (which disclaims liability or warranty for this information). If you have questions about a medical condition or this instruction, always ask your healthcare professional. [address] [address] any warranty or liability for your use of this information.

## 2018-11-30 ENCOUNTER — APPOINTMENT (OUTPATIENT)
Dept: GENERAL RADIOLOGY | Age: 58
End: 2018-11-30
Attending: PHYSICIAN ASSISTANT
Payer: COMMERCIAL

## 2018-11-30 ENCOUNTER — APPOINTMENT (OUTPATIENT)
Dept: CT IMAGING | Age: 58
End: 2018-11-30
Attending: PHYSICIAN ASSISTANT
Payer: COMMERCIAL

## 2018-11-30 ENCOUNTER — HOSPITAL ENCOUNTER (EMERGENCY)
Age: 58
Discharge: HOME | End: 2018-11-30
Attending: STUDENT IN AN ORGANIZED HEALTH CARE EDUCATION/TRAINING PROGRAM | Admitting: STUDENT IN AN ORGANIZED HEALTH CARE EDUCATION/TRAINING PROGRAM
Payer: COMMERCIAL

## 2018-11-30 VITALS
SYSTOLIC BLOOD PRESSURE: 160 MMHG | OXYGEN SATURATION: 99 % | RESPIRATION RATE: 18 BRPM | HEART RATE: 68 BPM | TEMPERATURE: 98 F | DIASTOLIC BLOOD PRESSURE: 92 MMHG
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
## 2018-11-30 DIAGNOSIS — M79.602 ARM PAIN, LEFT: ICD-10-CM

## 2018-11-30 DIAGNOSIS — S09.90XA INJURY OF HEAD, INITIAL ENCOUNTER: ICD-10-CM

## 2018-11-30 PROCEDURE — 75810000053 HC SPLINT APPLICATION

## 2018-11-30 PROCEDURE — 73070 X-RAY EXAM OF ELBOW: CPT

## 2018-11-30 PROCEDURE — 99283 EMERGENCY DEPT VISIT LOW MDM: CPT

## 2018-11-30 PROCEDURE — 70450 CT HEAD/BRAIN W/O DYE: CPT

## 2018-11-30 PROCEDURE — 74011250637 HC RX REV CODE- 250/637: Performed by: PHYSICIAN ASSISTANT

## 2018-11-30 RX ORDER — OXYCODONE AND ACETAMINOPHEN 5; 325 MG/1; MG/1
2 TABLET ORAL
Status: COMPLETED | OUTPATIENT
Start: 2018-11-30 | End: 2018-11-30

## 2018-11-30 RX ORDER — IBUPROFEN 600 MG/1
600 TABLET ORAL
Status: COMPLETED | OUTPATIENT
Start: 2018-11-30 | End: 2018-11-30

## 2018-11-30 RX ORDER — OXYCODONE AND ACETAMINOPHEN 5; 325 MG/1; MG/1
1-2 TABLET ORAL
Qty: 10 TAB | Refills: 0 | Status: SHIPPED | OUTPATIENT
Start: 2018-11-30 | End: 2019-02-13 | Stop reason: ALTCHOICE

## 2018-11-30 RX ORDER — IBUPROFEN 600 MG/1
600 TABLET ORAL
Qty: 20 TAB | Refills: 0 | Status: SHIPPED | OUTPATIENT
Start: 2018-11-30 | End: 2019-08-20

## 2018-11-30 RX ADMIN — OXYCODONE AND ACETAMINOPHEN 2 TABLET: 5; 325 TABLET ORAL at 19:02

## 2018-11-30 RX ADMIN — IBUPROFEN 600 MG: 600 TABLET ORAL at 19:02

## 2018-11-30 NOTE — ED PROVIDER NOTES
66 y.o. R-handed female with past medical history significant for HTN and HLD who presents ambulatory to the ED, accompanied by , s/p fall. Pt states that around 1630 she was on a table taking posters off the wall when she stepped down onto a chair; the chair moved out from underneath her causing her to fall onto the ground, hitting the back of her head on the concrete floor and her L arm on the table on the way down. She notes that she had an \"instant headache\" after her head hit the floor; her L forearm has been swelling persistently, prompting her to visit [facility] for evaluation. Pt states that [facility] was \"unable to help her\", and advised her to go to the ED for further evaluation. She rates her L elbow pain a 7/10. Pt denies LOC, CP and SOB. There are no other acute medical concerns at this time. Negative Tobacco use (former smoker); Positive EtOH use; Negative Illicit Drug Abuse PCP: Cheryl Beltre MD 
 
Note written by Brianna Washington, as dictated by Ritu Watt PA-C 6:57 PM 
 
 
 
The history is provided by the patient and medical records. No  was used. Past Medical History:  
Diagnosis Date  Hypertension Past Surgical History:  
Procedure Laterality Date  HX GYN Left Removal of fibroid tumor from ovary No family history on file. Social History Socioeconomic History  Marital status:  Spouse name: Not on file  Number of children: Not on file  Years of education: Not on file  Highest education level: Not on file Social Needs  Financial resource strain: Not on file  Food insecurity - worry: Not on file  Food insecurity - inability: Not on file  Transportation needs - medical: Not on file  Transportation needs - non-medical: Not on file Occupational History  Not on file Tobacco Use  Smoking status: Former Smoker  Smokeless tobacco: Never Used Substance and Sexual Activity  Alcohol use: Yes  Drug use: No  
 Sexual activity: Not on file Other Topics Concern  Not on file Social History Narrative  Not on file ALLERGIES: Erythromycin estolate Review of Systems Constitutional: Negative. Negative for chills, fatigue and fever. HENT: Negative. Negative for congestion, ear pain, facial swelling, rhinorrhea, sneezing and sore throat. Eyes: Negative for pain, discharge and itching. Respiratory: Negative for cough, chest tightness and shortness of breath. Cardiovascular: Negative. Negative for chest pain and leg swelling. Gastrointestinal: Negative. Negative for abdominal distention, abdominal pain, constipation, diarrhea, nausea and vomiting. Genitourinary: Negative for difficulty urinating, frequency and urgency. Musculoskeletal: Positive for arthralgias, joint swelling and myalgias. Negative for back pain, neck pain and neck stiffness. Skin: Negative for color change and rash. Neurological: Positive for headaches. Negative for dizziness, syncope and numbness. Psychiatric/Behavioral: Negative for confusion and decreased concentration. All other systems reviewed and are negative. Vitals:  
 11/30/18 1753 11/30/18 1858 BP:  (!) 189/95 Pulse: 80 65 Resp:  18 Temp:  97.9 °F (36.6 °C) SpO2: 98% 98% Physical Exam  
Constitutional: She is oriented to person, place, and time. She appears well-developed and well-nourished. No distress. Tearful  female in NAD but appears to be in pain HENT:  
Head: Normocephalic and atraumatic. Right Ear: External ear normal. No hemotympanum. Left Ear: External ear normal. No hemotympanum. Nose: Nose normal.  
Mouth/Throat: Oropharynx is clear and moist. No oropharyngeal exudate. Eyes: Conjunctivae and EOM are normal. Pupils are equal, round, and reactive to light. Right eye exhibits no discharge.  Left eye exhibits no discharge. No scleral icterus. Neck: Normal range of motion. Neck supple. Cardiovascular: Regular rhythm. Exam reveals no gallop and no friction rub. No murmur heard. Pulmonary/Chest: Effort normal and breath sounds normal. She has no wheezes. She has no rales. Abdominal: Soft. Bowel sounds are normal. She exhibits no distension. There is no tenderness. There is no rebound and no guarding. Musculoskeletal:  
     Arms: 
Neurological: She is alert and oriented to person, place, and time. No cranial nerve deficit. Coordination normal.  
Skin: Skin is warm and dry. She is not diaphoretic. Psychiatric: She has a normal mood and affect. Her behavior is normal.  
Nursing note and vitals reviewed. MDM Number of Diagnoses or Management Options Arm pain, left:  
Fall, initial encounter:  
Injury of head, initial encounter:  
Diagnosis management comments: 67 yo  female with mechanical fall and resultant head injury and left arm injury. Concern for forearm fracture. N/V intact. Doubt ICH Plan Xray left elbow Ct head Analgesia Scott Montaño Amount and/or Complexity of Data Reviewed Tests in the radiology section of CPT®: ordered and reviewed Independent visualization of images, tracings, or specimens: yes Procedures Progress note Imaging reviewed. No fracture noted. Sig swelling and pain. Concern for occult fracture. Will splint with ortho follow-up. Scott Thakur Splint applied by nursing and evaluated by Scott Montaño. Neurovascular status intact post splint application. Desired position maintained. 9:23 PM 
Patient's results have been reviewed with them.   Patient and/or family have verbally conveyed their understanding and agreement of the patient's signs, symptoms, diagnosis, treatment and prognosis and additionally agree to follow up as recommended or return to the Emergency Room should their condition change prior to follow-up. Discharge instructions have also been provided to the patient with some educational information regarding their diagnosis as well a list of reasons why they would want to return to the ER prior to their follow-up appointment should their condition change.

## 2018-11-30 NOTE — ED TRIAGE NOTES
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 She was standing on a chair and the chair slipped from under her. Pt has swollen R elbow, reports hitting head, denies LOC. Pt acting appropriate in triage

## 2018-12-01 NOTE — DISCHARGE INSTRUCTIONS
Follow-up with orthopedist  Apply ice  Elevated the arm  Ibuprofen 600 mg every 6 hrs as needed for pain  Percocet 1-2 tabs every 6 hrs as needed for increased pain  Exercise caution medication may cause sedation  Return for any new or worsening. Sofy TobiasWilfredo, [place]       Preventing Falls: Care Instructions  Your Care Instructions    Getting around your home safely can be a challenge if you have injuries or health problems that make it easy for you to fall. Loose rugs and furniture in walkways are among the dangers for many older people who have problems walking or who have poor eyesight. People who have conditions such as arthritis, osteoporosis, or dementia also have to be careful not to fall. You can make your home safer with a few simple measures. Follow-up care is a key part of your treatment and safety. Be sure to make and go to all appointments, and call your doctor if you are having problems. It's also a good idea to know your test results and keep a list of the medicines you take. How can you care for yourself at home? Taking care of yourself  · You may get dizzy if you do not drink enough water. To prevent dehydration, drink plenty of fluids, enough so that your urine is light yellow or clear like water. Choose water and other caffeine-free clear liquids. If you have kidney, heart, or liver disease and have to limit fluids, talk with your doctor before you increase the amount of fluids you drink. · Exercise regularly to improve your strength, muscle tone, and balance. Walk if you can. Swimming may be a good choice if you cannot walk easily. · Have your vision and hearing checked each year or any time you notice a change. If you have trouble seeing and hearing, you might not be able to avoid objects and could lose your balance. · Know the side effects of the medicines you take. Ask your doctor or pharmacist whether the medicines you take can affect your balance.  Sleeping pills or sedatives can affect your balance. · Limit the amount of alcohol you drink. Alcohol can impair your balance and other senses. · Ask your doctor whether calluses or corns on your feet need to be removed. If you wear loose-fitting shoes because of calluses or corns, you can lose your balance and fall. · Talk to your doctor if you have numbness in your feet. Preventing falls at home  · Remove raised doorway thresholds, throw rugs, and clutter. Repair loose carpet or raised areas in the floor. · Move furniture and electrical cords to keep them out of walking paths. · Use nonskid floor wax, and wipe up spills right away, especially on ceramic tile floors. · If you use a walker or cane, put rubber tips on it. If you use crutches, clean the bottoms of them regularly with an abrasive pad, such as steel wool. · Keep your house well lit, especially [place], and outside walkways. Use night-lights in areas such as hallways and bathrooms. Add extra light switches or use remote switches (such as switches that go on or off when you clap your hands) to make it easier to turn lights on if you have to get up during the night. · Install sturdy handrails on stairways. · Move items in your cabinets so that the things you use a lot are on the lower shelves (about waist level). · Keep a cordless phone and a flashlight with new batteries by your bed. If possible, put a phone in each of the main rooms of your house, or carry a cell phone in case you fall and cannot reach a phone. Or, you can wear a device around your neck or wrist. You push a button that sends a signal for help. · Wear low-heeled shoes that fit well and give your feet good support. Use footwear with nonskid soles. Check the heels and soles of your shoes for wear. Repair or replace worn heels or soles. · Do not wear socks without shoes on wood floors. · Walk on the grass when the sidewalks are slippery.  If you live in an area that gets snow and ice in the winter, sprinkle salt on slippery steps and sidewalks. Preventing falls in the bath  · Install grab bars and nonskid mats inside and outside your shower or tub and near the toilet and sinks. · Use shower chairs and bath benches. · Use a hand-held shower head that will allow you to sit while showering. · Get into a tub or shower by putting the weaker leg in first. Get out of a tub or shower with your strong side first.  · Repair loose toilet seats and consider installing a raised toilet seat to make getting on and off the toilet easier. · Keep your bathroom door unlocked while you are in the shower. Where can you learn more? Go to http://goldenDossierViewisidoro.info/. Enter 0476 [id number] 69 [id number] in the search box to learn more about \"Preventing Falls: Care Instructions. \"  Current as of: March 16, 2018  Content Version: 11.8  © 8347-7707 [employer]. Care instructions adapted under license by [employer] (which disclaims liability or warranty for this information). If you have questions about a medical condition or this instruction, always ask your healthcare professional. [place] [address] any warranty or liability for your use of this information. Learning About a Closed Head Injury  What is a closed head injury? A closed head injury happens when your head gets hit hard. The strong force of the blow causes your brain to shake in your skull. This movement can cause the brain to bruise, swell, or tear. Sometimes nerves or blood vessels also get damaged. This can cause bleeding in or around the brain. A concussion is a type of closed head injury. What are the symptoms? If you have a mild concussion, you may have a mild headache or feel \"not quite right. \" These symptoms are common. They usually go away over a few days to 4 weeks. But sometimes after a concussion, you feel like you can't function as well as before the injury. And you have new symptoms.  This is called postconcussive syndrome. You may:  · Find it harder to solve problems, think, concentrate, or remember. · Have headaches. · Have changes in your sleep patterns, such as not being able to sleep or sleeping all the time. · Have changes in your personality. · Not be interested in your usual activities. · Feel angry or anxious without a clear reason. · Lose your sense of taste or smell. · Be dizzy, lightheaded, or unsteady. It may be hard to stand or walk. How is a closed head injury treated? Any person who may have a concussion needs to see a doctor. Some people have to stay in the hospital to be watched. Others can go home safely. If you go home, follow your doctor's instructions. He or she will tell you if you need someone to watch you closely for the next 24 hours or longer. Rest is the best treatment. Get plenty of sleep at night. And try to rest during the day. · Avoid activities that are physically or mentally demanding. These include housework, exercise, and schoolwork. And don't play video games, send text messages, or use the computer. You may need to change your school or work schedule to be able to avoid these activities. · Ask your doctor when it's okay to drive, ride a bike, or operate machinery. · Take an over-the-counter pain medicine, such as acetaminophen (Tylenol), ibuprofen (Advil, Motrin), or naproxen (Aleve). Be safe with medicines. Read and follow all instructions on the label. · Check with your doctor before you use any other medicines for pain. · Do not drink alcohol or use illegal drugs. They can slow recovery. They can also increase your risk of getting a second head injury. Follow-up care is a key part of your treatment and safety. Be sure to make and go to all appointments, and call your doctor if you are having problems. It's also a good idea to know your test results and keep a list of the medicines you take. Where can you learn more?   Go to http://terra.info/. Enter E235 in the search box to learn more about \"Learning About a Closed Head Injury. \"  Current as of: June 4, 2018  Content Version: 11.8  © 5612-8397 [employer]. Care instructions adapted under license by [employer] (which disclaims liability or warranty for this information). If you have questions about a medical condition or this instruction, always ask your healthcare professional. [place] [address] any warranty or liability for your use of this information.

## 2018-12-01 NOTE — ED NOTES
PA reviewed discharge instructions and options with patient and patient verbalized understanding. RN reviewed discharge instructions using teachback method. Pt ambulated to exit without difficulty and in no signs of acute distress escorted by self who will drive home. No complaints or needs expressed at this time. Patient was counseled on medications prescribed at discharge. VSS at time of discharge. Pt to call Ortho in the morning for follow up.

## 2019-02-13 ENCOUNTER — OFFICE VISIT (OUTPATIENT)
Dept: SURGERY | Age: 59
End: 2019-02-13

## 2019-02-13 ENCOUNTER — HOSPITAL ENCOUNTER (OUTPATIENT)
Dept: MAMMOGRAPHY | Age: 59
Discharge: HOME | End: 2019-02-13
Attending: SURGERY
Payer: COMMERCIAL

## 2019-02-13 VITALS
HEIGHT: 59 IN | WEIGHT: 144 LBS | BODY MASS INDEX: 29.03 KG/M2 | HEART RATE: 100 BPM | DIASTOLIC BLOOD PRESSURE: 66 MMHG | SYSTOLIC BLOOD PRESSURE: 127 MMHG

## 2019-02-13 DIAGNOSIS — N60.92 ATYPICAL DUCTAL HYPERPLASIA OF LEFT BREAST: Primary | ICD-10-CM

## 2019-02-13 DIAGNOSIS — Z91.89 AT HIGH RISK FOR BREAST CANCER: ICD-10-CM

## 2019-02-13 DIAGNOSIS — R92.8 ABNORMAL MAMMOGRAM: ICD-10-CM

## 2019-02-13 PROCEDURE — 77061 BREAST TOMOSYNTHESIS UNI: CPT

## 2019-02-13 NOTE — PROGRESS NOTES
HISTORY OF PRESENT ILLNESS  Richard Boateng is a 64 y.o. female. HPI  ESTABLISHED patient here for follow-up of LEFT breast ADH diagnosed last summer. She opted not to have any surgery; wants to just follow this. She is not feeling any breast lumps, has no concerns today. LEFT diagnostic mammogram today at [facility], BIRADS 2, benign. Past Medical History:   Diagnosis Date    Hyperlipidemia     Hypertension        Past Surgical History:   Procedure Laterality Date    HX BREAST BIOPSY Left 07/25/2015    ADH- pt declined excisional bx.  HX GYN Left     Removal of fibroid tumor from ovary        Social History     Socioeconomic History    Marital status:      Spouse name: Not on file    Number of children: Not on file    Years of education: Not on file    Highest education level: Not on file   Social Needs    Financial resource strain: Not on file    Food insecurity - worry: Not on file    Food insecurity - inability: Not on file   [employer] needs - medical: Not on file   [employer] needs - non-medical: Not on file   Occupational History    Not on file   Tobacco Use    Smoking status: Former Smoker    Smokeless tobacco: Never Used   Substance and Sexual Activity    Alcohol use: Yes    Drug use: No    Sexual activity: Not on file   Other Topics Concern    Not on file   Social History Narrative    Not on file       Current Outpatient Medications on File Prior to Visit   Medication Sig Dispense Refill    ibuprofen (MOTRIN) 600 mg tablet Take 1 Tab by mouth every six (6) hours as needed for Pain. 20 Tab 0    estradiol-norethindrone (ACTIVELLA) 0.5-0.1 mg per tablet TAKE 1 TABLET BY MOUTH EVERY DAY  1    lisinopril-hydroCHLOROthiazide (PRINZIDE, ZESTORETIC) 20-25 mg per tablet TAKE 1 TABLET BY MOUTH EVERY DAY  1    rosuvastatin (CRESTOR) 10 mg tablet       potassium chloride (KLOR-CON 10 PO) Take  by mouth.  ferrous sulfate (IRON PO) Take  by mouth.       fluoxetine (PROZAC) 20 mg capsule Take 20 mg by mouth daily.  aspirin 81 mg chewable tablet Take 81 mg by mouth daily. No current facility-administered medications on file prior to visit. Allergies   Allergen Reactions    Erythromycin Estolate Hives       OB History     Obstetric Comments    Menarche 17, LMP 7/23/18, # of children 2, age of 4st delivery 41, Hysterectomy/oophorectomy No/No/, Breast bx Yes, LEFT, history of breast feeding Yes, BCP Yes, Hormone therapy Yes, currently          ROS    Physical Exam   Cardiovascular: Normal rate, regular rhythm and normal heart sounds. Pulmonary/Chest: Breath sounds normal. Right breast exhibits no inverted nipple, no mass, no nipple discharge, no skin change and no tenderness. Left breast exhibits no inverted nipple, no mass, no nipple discharge, no skin change and no tenderness. Breasts are symmetrical.       Lymphadenopathy:        Right cervical: No superficial cervical, no deep cervical and no posterior cervical adenopathy present. Left cervical: No superficial cervical, no deep cervical and no posterior cervical adenopathy present. She has no axillary adenopathy. Right axillary: No pectoral and no lateral adenopathy present. Left axillary: No pectoral and no lateral adenopathy present. ASSESSMENT and PLAN    ICD-10-CM ICD-9-CM    1. Atypical ductal hyperplasia of left breast N60.92 610.8    2. At high risk for breast cancer Z91.89 V49.89       Patient presents for high risk f/u for LEFT breast ADH, and is doing well overall. Physical exam today normal. F/U in 6 months with Rachana Levi NP, [facility]. This plan was reviewed with the patient and patient agrees. All questions were answered.     Written by Enid Iraheta, as dictated by Dr. Anthony Hall MD.

## 2019-02-13 NOTE — PROGRESS NOTES
HISTORY OF PRESENT ILLNESS Jesslyn Primrose is a 64 y.o. female. HPI   ESTABLISHED patient here for follow-up of LEFT breast ADH diagnosed last summer. She opted not to have any surgery; wants to just follow this. She is not feeling any breast lumps, has no concerns today. LEFT diagnostic mammogram today at [facility], BIRADS 2, benign. ROS Physical Exam 
 
ASSESSMENT and PLAN 
{ASSESSMENT/PLAN:[id number]}

## 2019-08-20 ENCOUNTER — HOSPITAL ENCOUNTER (OUTPATIENT)
Dept: MAMMOGRAPHY | Age: 59
Discharge: HOME | End: 2019-08-20
Attending: SURGERY
Payer: COMMERCIAL

## 2019-08-20 ENCOUNTER — OFFICE VISIT (OUTPATIENT)
Dept: SURGERY | Age: 59
End: 2019-08-20

## 2019-08-20 VITALS
HEART RATE: 63 BPM | BODY MASS INDEX: 29.84 KG/M2 | DIASTOLIC BLOOD PRESSURE: 59 MMHG | WEIGHT: 148 LBS | SYSTOLIC BLOOD PRESSURE: 148 MMHG | HEIGHT: 59 IN

## 2019-08-20 DIAGNOSIS — N60.12 FIBROCYSTIC BREAST CHANGES OF BOTH BREASTS: ICD-10-CM

## 2019-08-20 DIAGNOSIS — Z12.31 VISIT FOR SCREENING MAMMOGRAM: ICD-10-CM

## 2019-08-20 DIAGNOSIS — N60.92 ATYPICAL DUCTAL HYPERPLASIA OF LEFT BREAST: Primary | ICD-10-CM

## 2019-08-20 DIAGNOSIS — N60.11 FIBROCYSTIC BREAST CHANGES OF BOTH BREASTS: ICD-10-CM

## 2019-08-20 PROCEDURE — 77067 SCR MAMMO BI INCL CAD: CPT

## 2019-08-20 NOTE — PROGRESS NOTES
HISTORY OF PRESENT ILLNESS  Nidia Moncada is a 64 y.o. female. HPI   ESTABLISHED patient here for follow-up of LEFT breast ADH diagnosed in 2018.  She has opted for observation of this and will have surgery if anything changes on imaging. She is not feeling any breast lumps, has no concerns today. 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 Breast history -  Left ADH on core biopsy in 2018, but has not had area excised. OB History    None      Obstetric Comments   Menarche 17, LMP 7/23/18, # of children 2, age of 4st delivery 41, Hysterectomy/oophorectomy No/No/, Breast bx Yes, LEFT, history of breast feeding Yes, BCP Yes, Hormone therapy Yes, currently             Past Surgical History:   Procedure Laterality Date    HX BREAST BIOPSY Left 07/25/2015    ADH- pt declined excisional bx.  HX GYN Left     Removal of fibroid tumor from ovary      [facility] Results (most recent):  Results from Hospital Encounter encounter on 08/20/19   [facility] MAMMO BI SCREENING INCL CAD    Narrative STUDY: Bilateral digital screening mammogram    INDICATION:  Screening. COMPARISON: 2/13/2019, 8/6/2018, 7/18/2018, 7/8/2016, and 7/19/2012. BREAST COMPOSITION:  The breasts are heterogeneously dense, which may obscure  small masses. FINDINGS: Bilateral digital screening mammography was performed and is  interpreted in conjunction with a computer assisted detection (CAD) system. No  suspicious masses or calcifications are identified. There has been no  significant change. Impression IMPRESSION:  BI-RADS 1: Negative. No mammographic evidence of malignancy. No change. RECOMMENDATIONS:  Next screening mammogram is recommended in one year. The patient will be notified of these results today. No family history of breast or ovarian cancer. ROS    Physical Exam   Constitutional: She appears well-developed and well-nourished.    Pulmonary/Chest: Right breast exhibits no inverted nipple, no mass, no nipple discharge, no skin change and no tenderness. Left breast exhibits no inverted nipple, no mass, no nipple discharge, no skin change and no tenderness. Breasts are symmetrical.   Musculoskeletal: Normal range of motion. UE x 2   Lymphadenopathy:     She has no axillary adenopathy. Right: No supraclavicular adenopathy present. Left: No supraclavicular adenopathy present. Skin: Skin is warm, dry and intact. Chest and breasts examined   Psychiatric: She has a normal mood and affect. Her speech is normal and behavior is normal.     Visit Vitals  /59   Pulse 63   Ht 4' 11\" (1.499 m)   Wt 148 lb (67.1 kg)   LMP 08/15/2019   BMI 29.89 kg/m²     ASSESSMENT and PLAN  Encounter Diagnoses   Name Primary?  Atypical ductal hyperplasia of left breast Yes    Fibrocystic breast changes of both breasts      Normal exam and imaging. Discussed atypia diagnosis. Reviewed 2D vs 3D mammogram.  Will plan to continue with annual mammograms - BSmammgram 3D due in 1 year. Will RTC in 1 year or sooner PRN. At next visit we will discuss if annual office visits are necessary or if she can been followed by mammograms alone. She is comfortable with this plan. All questions answered and she stated understanding.

## 2020-12-29 ENCOUNTER — TRANSCRIBE ORDER (OUTPATIENT)
Dept: SCHEDULING | Age: 60
End: 2020-12-29

## 2020-12-29 DIAGNOSIS — Z12.31 SCREENING MAMMOGRAM FOR HIGH-RISK PATIENT: Primary | ICD-10-CM

## 2021-01-06 ENCOUNTER — OFFICE VISIT (OUTPATIENT)
Dept: SURGERY | Age: 61
End: 2021-01-06
Payer: COMMERCIAL

## 2021-01-06 ENCOUNTER — HOSPITAL ENCOUNTER (OUTPATIENT)
Dept: MAMMOGRAPHY | Age: 61
Discharge: HOME | End: 2021-01-06
Attending: SURGERY
Payer: COMMERCIAL

## 2021-01-06 VITALS
DIASTOLIC BLOOD PRESSURE: 60 MMHG | WEIGHT: 147 LBS | BODY MASS INDEX: 29.64 KG/M2 | SYSTOLIC BLOOD PRESSURE: 133 MMHG | HEIGHT: 59 IN | TEMPERATURE: 97.4 F | HEART RATE: 75 BPM

## 2021-01-06 DIAGNOSIS — Z12.39 BREAST CANCER SCREENING: ICD-10-CM

## 2021-01-06 DIAGNOSIS — Z91.89 AT HIGH RISK FOR BREAST CANCER: ICD-10-CM

## 2021-01-06 DIAGNOSIS — N60.92 ATYPICAL DUCTAL HYPERPLASIA OF LEFT BREAST: Primary | ICD-10-CM

## 2021-01-06 PROCEDURE — 99213 OFFICE O/P EST LOW 20 MIN: CPT | Performed by: SURGERY

## 2021-01-06 PROCEDURE — 77063 BREAST TOMOSYNTHESIS BI: CPT

## 2021-01-06 NOTE — PATIENT INSTRUCTIONS
Mammogram: About This Test 
What is it? A mammogram is an X-ray of the breast that is used to screen for breast cancer. This test can find tumors that are too small for you or your doctor to feel. Cancer is most easily treated when it is found at an early stage. Why is this test done? A mammogram is done to: 
· Look for breast cancer in women who don't have symptoms. · Find breast cancer in women who have symptoms. Symptoms of breast cancer may include a lump or thickening in the breast, nipple discharge, or dimpling of the skin on one area of the breast. 
· Find an area of suspicious breast tissue to remove for an exam under a microscope (biopsy). How do you prepare for the test? 
If you've had a mammogram before at another clinic, have the results sent or bring them with you to your appointment. On the day of the mammogram, don't use any deodorant. And don't use perfume, powders, or ointments near or on your breasts. The residue left on your skin by these substances may interfere with the X-rays. How is the test done? · You will need to take off any jewelry that might interfere with the X-ray pictures. · You will need to take off your clothes above the waist. 
· You will be given a cloth or paper gown to use during the test. 
· You probably will stand during the mammogram. 
· One at a time, your breasts will be placed on a flat plate. · Another plate is then pressed firmly against your breast to help flatten out the breast tissue. You may be asked to lift your arm. · For a few seconds while the X-ray picture is being taken, you will need to hold your breath. · At least two pictures are taken of each breast. One is taken from the top and one from the side. How does having a mammogram feel? A mammogram is often uncomfortable but rarely painful. If you have sensitive or fragile skin or a skin condition, let the technician know before you have your exam. If you have menstrual periods, the procedure is more comfortable when done within 2 weeks after your period has ended. Having your breasts flattened is usually uncomfortable, but it helps the technician get the best images. How long does the test take? · The test will take about 10 to 15 minutes. You may be in the clinic for up to an hour. · You may be asked to wait a few minutes while the images are checked to make sure they don't need to be redone. What happens after the test? 
· You will probably be able to go home right away. · You can go back to your usual activities right away. Follow-up care is a key part of your treatment and safety. Be sure to make and go to all appointments, and call your doctor if you are having problems. It's also a good idea to keep a list of the medicines you take. Ask your doctor when you can expect to have your test results. Where can you learn more? Go to http://www.gray.com/ Enter D166 in the search box to learn more about \"Mammogram: About This Test.\" Current as of: April 29, 2020               Content Version: 12.6 © 5722-7199 [employer]. Care instructions adapted under license by [employer] (which disclaims liability or warranty for this information). If you have questions about a medical condition or this instruction, always ask your healthcare professional. [place] [address] any warranty or liability for your use of this information.

## 2021-01-06 NOTE — PROGRESS NOTES
HISTORY OF PRESENT ILLNESS  Sandy Lilly is a 64 y.o. female. HPI    ESTABLISHED patient here for follow-up of LEFT breast ADH diagnosed in 2018. Kristen Dias opted for observation of this and will have surgery if anything changes on imaging.  She is not feeling any breast lumps, has no concerns today. Mammogram today was normal.      Breast history -  Left ADH on core biopsy in 2018, but has not had area excised. [facility] Results (most recent):       Results from [place] encounter on 01/06/21   [facility] 3D JACOB W MAMMO BI SCREENING INCL CAD     Narrative STUDY: Bilateral digital screening mammogram with 3-D tomosynthesis     INDICATION:  Screening.     COMPARISON: Prior studies dating back to 2012     BREAST COMPOSITION: There are scattered areas of fibroglandular density.     FINDINGS: Bilateral digital screening mammography was performed and is  interpreted in conjunction with a computer assisted detection (CAD) system. Additionally, tomosynthesis of both breasts in the CC and MLO projections was  performed. No suspicious masses or calcifications are identified. There has been  no significant change.        Impression IMPRESSION:  BI-RADS 1: Negative. No mammographic evidence of malignancy.     RECOMMENDATIONS:  Next screening mammogram is recommended in one year.      The patient will be notified of these results. Past Medical History:   Diagnosis Date    Hyperlipidemia     Hypertension        Past Surgical History:   Procedure Laterality Date    HX BREAST BIOPSY Left 07/25/2015    ADH- pt declined excisional bx.     HX GYN Left     Removal of fibroid tumor from ovary        Social History     Socioeconomic History    Marital status:      Spouse name: Not on file    Number of children: Not on file    Years of education: Not on file    Highest education level: Not on file   Occupational History    Not on file   Social Needs    Financial resource strain: Not on file   [place] insecurity     Worry: Not on file     Inability: Not on file    Transportation needs     Medical: Not on file     Non-medical: Not on file   Tobacco Use    Smoking status: Former Smoker    Smokeless tobacco: Never Used   Substance and Sexual Activity    Alcohol use: Yes    Drug use: No    Sexual activity: Not on file   Lifestyle    Physical activity     Days per week: Not on file     Minutes per session: Not on file    Stress: Not on file   Relationships    Social connections     Talks on phone: Not on file     Gets together: Not on file     Attends Nondenominational service: Not on file     Active member of club or organization: Not on file     Attends meetings of clubs or organizations: Not on file     Relationship status: Not on file    Intimate partner violence     Fear of current or ex partner: Not on file     Emotionally abused: Not on file     Physically abused: Not on file     Forced sexual activity: Not on file   Other Topics Concern    Not on file   Social History Narrative    Not on file       Current Outpatient Medications on File Prior to Visit   Medication Sig Dispense Refill    lisinopril-hydroCHLOROthiazide (PRINZIDE, ZESTORETIC) 20-25 mg per tablet TAKE 1 TABLET BY MOUTH EVERY DAY  1    rosuvastatin (CRESTOR) 10 mg tablet       ferrous sulfate (IRON PO) Take  by mouth.  fluoxetine (PROZAC) 20 mg capsule Take 20 mg by mouth daily.  aspirin 81 mg chewable tablet Take 81 mg by mouth daily.  [DISCONTINUED] estradiol-norethindrone (ACTIVELLA) 0.5-0.1 mg per tablet TAKE 1 TABLET BY MOUTH EVERY DAY  1    [DISCONTINUED] potassium chloride (KLOR-CON 10 PO) Take  by mouth. No current facility-administered medications on file prior to visit. Allergies   Allergen Reactions    Erythromycin Estolate Hives       OB History    No obstetric history on file.       Obstetric Comments   Menarche 17, LMP 7/23/18, # of children 2, age of 4st delivery 41, Hysterectomy/oophorectomy No/No/, Breast bx Yes, LEFT, history of breast feeding Yes, BCP Yes, Hormone therapy Yes, currently               ROS      Physical Exam  Exam conducted with a chaperone present. Cardiovascular:      Rate and Rhythm: Normal rate and regular rhythm. Heart sounds: Normal heart sounds. Pulmonary:      Breath sounds: Normal breath sounds. Chest:      Breasts: Breasts are symmetrical.         Right: Normal. No swelling, bleeding, inverted nipple, mass, nipple discharge, skin change or tenderness. Left: Normal. No swelling, bleeding, inverted nipple, mass, nipple discharge, skin change or tenderness. Lymphadenopathy:      Cervical:      Right cervical: No superficial, deep or posterior cervical adenopathy. Left cervical: No superficial, deep or posterior cervical adenopathy. Upper Body:      Right upper body: No supraclavicular or axillary adenopathy. Left upper body: No supraclavicular or axillary adenopathy. ASSESSMENT and PLAN    ICD-10-CM ICD-9-CM    1. Atypical ductal hyperplasia of left breast  N60.92 610.8    2. At high risk for breast cancer  Z91.89 V49.89       Patient presents to f/u on LEFT breast ADH, and is doing well overall. Physical exam today normal. Pt to continue annual mammography. Will consider MRI at next annual visit. F/U in 1 year with Peyton Carmen NP. This plan was reviewed with the patient and patient agrees. All questions were answered. Total time spent was 20 minutes.     Written by Renard Crawford, as dictated by Dr. Binh Soria MD.

## 2021-01-06 NOTE — PROGRESS NOTES
HISTORY OF PRESENT ILLNESS Steffany Hensley is a 64 y.o. female. HPI   ESTABLISHED patient here for follow-up of LEFT breast ADH diagnosed in 2018.  She has opted for observation of this and will have surgery if anything changes on imaging. She is not feeling any breast lumps, has no concerns today. Mammogram today was normal.   
Breast history - Left ADH on core biopsy in 2018, but has not had area excised. [facility] Results (most recent): 
Results from Hospital Encounter encounter on 01/06/21 [facility] 3D JACOB W MAMMO BI SCREENING INCL CAD Narrative STUDY: Bilateral digital screening mammogram with 3-D tomosynthesis INDICATION:  Screening. COMPARISON: Prior studies dating back to 2012 BREAST COMPOSITION: There are scattered areas of fibroglandular density. FINDINGS: Bilateral digital screening mammography was performed and is 
interpreted in conjunction with a computer assisted detection (CAD) system. Additionally, tomosynthesis of both breasts in the CC and MLO projections was 
performed. No suspicious masses or calcifications are identified. There has been 
no significant change. Impression IMPRESSION: 
BI-RADS 1: Negative. No mammographic evidence of malignancy. RECOMMENDATIONS: 
Next screening mammogram is recommended in one year. The patient will be notified of these results. ROS Physical Exam 
 
ASSESSMENT and PLAN 
{ASSESSMENT/PLAN:18820}

## 2021-12-07 NOTE — COMMUNICATION BODY
HISTORY OF PRESENT ILLNESS  Solomon Ferrer is a 66 y.o. female. HPI  ESTABLISHED patient here today for follow up. She was recently diagnosed with LEFT breast ADH. She had her breast MRI on 8/6/18. She is here because she has a few questions regarding diagnosis and follow up. She has no new breast problems at this time.      There is no FH of breast or ovarian cancer.     FINAL PATHOLOGIC DIAGNOSIS on 7/25/18-  Left breast, core biopsies   Focal atypical ductal hyperplasia (ADH)   Benign breast tissue with fibrocystic changes and associated microcalcifications      Recent imaging-  8/6/18- Breast MRI   FINDINGS:  The breasts demonstrate scattered fibroglandular densities. There is moderate  background parenchymal enhancement. Scattered enhancing foci are seen in both  breasts, left greater than right.      There is a Hydromark biopsy clip at the 3:00 position of the left breast; there  is an associated thin rim of peripheral nonmasslike enhancement. No associated  mass is evident. No other dominant mass lesion or suspicious nonmasslike  enhancement is seen in either breast. There are no pathologically enlarged  axillary or internal mammary lymph nodes.      Incidental note is made of a subcentimeter cyst in the left hepatic lobe.      IMPRESSION:  BI-RADS Assessment Category 2: Benign finding. The thin rim of peripheral  nonmasslike enhancement at the biopsy site in the left breast likely represents  postbiopsy changes; no associated suspicious mass is seen. Surgical management  of the known ADH is recommended. The asymmetric scattered enhancing foci in both  breasts are benign in appearance; this examination will serve as a baseline. Follow-up breast MRI in one year is recommended with the patient's high-risk  Status.     Past Medical History:   Diagnosis Date    Hypertension        Past Surgical History:   Procedure Laterality Date    HX GYN Left     Removal of fibroid tumor from ovary        Social 113 Hour(s) 19 Minute(s) History     Social History    Marital status:      Spouse name: N/A    Number of children: N/A    Years of education: N/A     Occupational History    Not on file. Social History Main Topics    Smoking status: Former Smoker    Smokeless tobacco: Never Used    Alcohol use Yes    Drug use: No    Sexual activity: Not on file     Other Topics Concern    Not on file     Social History Narrative       Current Outpatient Prescriptions on File Prior to Visit   Medication Sig Dispense Refill    ALPRAZolam (XANAX) 0.5 mg tablet Take one tab 30-45 mins prior to procedure. 5 Tab 0    estradiol-norethindrone (ACTIVELLA) 0.5-0.1 mg per tablet TAKE 1 TABLET BY MOUTH EVERY DAY  1    lisinopril-hydroCHLOROthiazide (PRINZIDE, ZESTORETIC) 20-25 mg per tablet TAKE 1 TABLET BY MOUTH EVERY DAY  1    rosuvastatin (CRESTOR) 10 mg tablet       potassium chloride (KLOR-CON 10 PO) Take  by mouth.  ferrous sulfate (IRON PO) Take  by mouth.  fluoxetine (PROZAC) 20 mg capsule Take 20 mg by mouth daily.  aspirin 81 mg chewable tablet Take 81 mg by mouth daily. No current facility-administered medications on file prior to visit. Allergies   Allergen Reactions    Erythromycin Estolate Hives       OB History     Obstetric Comments    Menarche 17, LMP 7/23/18, # of children 2, age of 4st delivery 41, Hysterectomy/oophorectomy No/No/, Breast bx Yes, LEFT, history of breast feeding Yes, BCP Yes, Hormone therapy Yes, currently          ROS    Physical Exam     ASSESSMENT and PLAN    ICD-10-CM ICD-9-CM    1. Atypical ductal hyperplasia of left breast N60.92 610.8    2. At high risk for breast cancer Z91.89 V49.89       Pt with LEFT breast ADH presents for f/u after breast MRI, and is doing well overall. She is anxious about possible surgery, and has many questions to discuss today.  Reviewed MRI results, which demonstrated normal enhancement, with bx clip visible in bx cavity, with thin rim of enhancement around bx cavity. Reviewed that this is normal. No mass or malignant type enhancement around clip. Further observation would not be unwarranted if pt is opposed to surgery. However, we also discussed that although MRI does not suggest any malignant findings, the safest thing to do would be excision, to be sure that there is not a concomitant breast cancer at bx site. Pt notes that she is anxious about surgery and anesthesia - discussed that we could use sedation rather than GA. Reviewed that excisional bx would entail sedation, and removal of small area of tissue around bx clip. Assured pt that excisional bx is a low risk procedure, and will verify PATH. Pt notes that her mother had liposarcoma. Reviewed that these are rare and are not genetic, and pt is at low risk for this despite her mother's hx. I will place order for excisional bx, and note that pt may call to schedule. Advised pt to have LEFT diagnostic mammo in 6 months, and also encouraged her to begin routine colonoscopies, as she notes she has not had one yet. This plan was reviewed with the patient and patient agrees. All questions were answered.     Written by Paco Feliciano, as dictated by Dr. Steph Bahena MD.

## 2022-01-05 ENCOUNTER — TRANSCRIBE ORDER (OUTPATIENT)
Dept: SCHEDULING | Age: 62
End: 2022-01-05

## 2022-01-05 DIAGNOSIS — Z12.31 SCREENING MAMMOGRAM FOR HIGH-RISK PATIENT: Primary | ICD-10-CM

## 2022-01-25 ENCOUNTER — OFFICE VISIT (OUTPATIENT)
Dept: SURGERY | Age: 62
End: 2022-01-25
Payer: COMMERCIAL

## 2022-01-25 VITALS
BODY MASS INDEX: 29.64 KG/M2 | HEART RATE: 90 BPM | DIASTOLIC BLOOD PRESSURE: 72 MMHG | WEIGHT: 147 LBS | SYSTOLIC BLOOD PRESSURE: 160 MMHG | HEIGHT: 59 IN

## 2022-01-25 DIAGNOSIS — N60.11 FIBROCYSTIC BREAST CHANGES OF BOTH BREASTS: ICD-10-CM

## 2022-01-25 DIAGNOSIS — N60.12 FIBROCYSTIC BREAST CHANGES OF BOTH BREASTS: ICD-10-CM

## 2022-01-25 DIAGNOSIS — Z91.89 AT HIGH RISK FOR BREAST CANCER: ICD-10-CM

## 2022-01-25 DIAGNOSIS — Z12.31 ENCOUNTER FOR SCREENING MAMMOGRAM FOR BREAST CANCER: ICD-10-CM

## 2022-01-25 DIAGNOSIS — N60.92 ATYPICAL DUCTAL HYPERPLASIA OF LEFT BREAST: Primary | ICD-10-CM

## 2022-01-25 PROCEDURE — 99213 OFFICE O/P EST LOW 20 MIN: CPT | Performed by: NURSE PRACTITIONER

## 2022-01-25 NOTE — PATIENT INSTRUCTIONS

## 2022-01-25 NOTE — PROGRESS NOTES
HISTORY OF PRESENT ILLNESS  Valdez Friend is a 63 y.o. female. HPI Established patient presents for follow-up for LEFT breast ADH. Denies breast mass, skin changes, nipple discharge and pain. Breast history -  Referring - Dr. Sanford Model   Left ADH on core biopsy in 2018, but has not had area excised. Family history -   No family history of breast or ovarian cancer. OB History    No obstetric history on file. Obstetric Comments   Menarche 17, LMP 7/23/18, # of children 2, age of 4st delivery 41, Hysterectomy/oophorectomy No/No/, Breast bx Yes, LEFT, history of breast feeding Yes, BCP Yes, Hormone therapy Yes, currently                 Past Surgical History:   Procedure Laterality Date    HX BREAST BIOPSY Left 07/25/2015    ADH- pt declined excisional bx.  HX GYN Left     Removal of fibroid tumor from ovary          [facility] Results (most recent):  Results from Hospital Encounter encounter on 01/06/21    [facility] 3D JACOB W MAMMO BI SCREENING INCL CAD    Narrative  STUDY: Bilateral digital screening mammogram with 3-D tomosynthesis    INDICATION:  Screening. COMPARISON: Prior studies dating back to 2012    BREAST COMPOSITION: There are scattered areas of fibroglandular density. FINDINGS: Bilateral digital screening mammography was performed and is  interpreted in conjunction with a computer assisted detection (CAD) system. Additionally, tomosynthesis of both breasts in the CC and MLO projections was  performed. No suspicious masses or calcifications are identified. There has been  no significant change. Impression  IMPRESSION:  BI-RADS 1: Negative. No mammographic evidence of malignancy. RECOMMENDATIONS:  Next screening mammogram is recommended in one year. The patient will be notified of these results. ROS    Physical Exam  Constitutional:       Appearance: Normal appearance.    Chest:   Breasts:      Right: No mass, nipple discharge, skin change, tenderness, axillary adenopathy or supraclavicular adenopathy. Left: No mass, nipple discharge, skin change, tenderness, axillary adenopathy or supraclavicular adenopathy. Musculoskeletal:      Comments: FROM - UE x 2   Lymphadenopathy:      Upper Body:      Right upper body: No supraclavicular or axillary adenopathy. Left upper body: No supraclavicular or axillary adenopathy. Neurological:      Mental Status: She is alert. Psychiatric:         Attention and Perception: Attention normal.         Mood and Affect: Mood normal.         Speech: Speech normal.         Behavior: Behavior normal.         Visit Vitals  BP (!) 160/72 (BP 1 Location: Right arm, BP Patient Position: Sitting, BP Cuff Size: Adult)   Pulse 90   Ht 4' 11\" (1.499 m)   Wt 147 lb (66.7 kg)   LMP 08/15/2019   BMI 29.69 kg/m²         ASSESSMENT and PLAN    ICD-10-CM ICD-9-CM    1. Atypical ductal hyperplasia of left breast  N60.92 610.8    2. At high risk for breast cancer  Z91.89 V49.89    3. Encounter for screening mammogram for breast cancer  Z12.31 V76.12    4. Fibrocystic breast changes of both breasts  N60.11 610.1     N60.12         Normal exam with no evidence of malignancy. ADH on biopsy in 2018, not excised and stable mammogram since then. BSmammogram 3D scheduled for next month. Discussed high risk screening MRIs. Will continue with mammograms since this was seen on mammogram and her breasts show scattered densities. RTC in 1 year or sooner PRN. She is comfortable with this plan. All questions answered and she stated understanding. Total time spent for this patient - 20 minutes.

## 2022-02-12 ENCOUNTER — HOSPITAL ENCOUNTER (OUTPATIENT)
Dept: MAMMOGRAPHY | Age: 62
Discharge: HOME | End: 2022-02-12
Attending: OBSTETRICS & GYNECOLOGY
Payer: COMMERCIAL

## 2022-02-12 DIAGNOSIS — Z12.31 SCREENING MAMMOGRAM FOR HIGH-RISK PATIENT: ICD-10-CM

## 2022-02-12 PROCEDURE — 77063 BREAST TOMOSYNTHESIS BI: CPT

## 2022-03-18 PROBLEM — Z91.89 AT HIGH RISK FOR BREAST CANCER: Status: ACTIVE | Noted: 2018-08-01

## 2022-03-19 PROBLEM — N60.92 ATYPICAL DUCTAL HYPERPLASIA OF LEFT BREAST: Status: ACTIVE | Noted: 2018-08-01

## 2023-01-31 ENCOUNTER — OFFICE VISIT (OUTPATIENT)
Dept: SURGERY | Age: 63
End: 2023-01-31
Payer: COMMERCIAL

## 2023-01-31 VITALS — BODY MASS INDEX: 30.24 KG/M2 | HEIGHT: 59 IN | WEIGHT: 150 LBS

## 2023-01-31 DIAGNOSIS — N60.12 FIBROCYSTIC BREAST CHANGES OF BOTH BREASTS: Primary | ICD-10-CM

## 2023-01-31 DIAGNOSIS — N60.92 ATYPICAL DUCTAL HYPERPLASIA OF LEFT BREAST: ICD-10-CM

## 2023-01-31 DIAGNOSIS — Z12.31 ENCOUNTER FOR SCREENING MAMMOGRAM FOR BREAST CANCER: ICD-10-CM

## 2023-01-31 DIAGNOSIS — N60.11 FIBROCYSTIC BREAST CHANGES OF BOTH BREASTS: Primary | ICD-10-CM

## 2023-01-31 RX ORDER — AMLODIPINE BESYLATE 5 MG/1
TABLET ORAL
COMMUNITY
Start: 2022-11-25

## 2023-01-31 NOTE — PROGRESS NOTES
HISTORY OF PRESENT ILLNESS  Sergio Rowland is a 66 y.o. female. HPI  Established patient presents for follow-up for LEFT breast ADH. Denies breast mass, skin changes, nipple discharge and pain. Breast history -  Referring - Dr. Milagro Diehl   Left ADH on core biopsy in 2018, but has not had area excised. Family history -   No family history of breast or ovarian cancer. OB History    No obstetric history on file. Obstetric Comments   Menarche 17, LMP 7/23/18, # of children 2, age of 4st delivery 41, Hysterectomy/oophorectomy No/No/, Breast bx Yes, LEFT, history of breast feeding Yes, BCP Yes, Hormone therapy Yes, currently                   Past Surgical History:   Procedure Laterality Date    HX BREAST BIOPSY Left 07/25/2018    ADH- pt declined excisional bx. HX GYN Left     Removal of fibroid tumor from ovary          [facility] Results (most recent):  Results from Hospital Encounter encounter on 02/12/22    [facility] 3D JACOB W MAMMO BI SCREENING INCL CAD    Narrative  STUDY: Bilateral digital screening mammogram with 3-D tomosynthesis    INDICATION:  Screening. COMPARISON: Prior studies dating back to 2012    BREAST COMPOSITION: There are scattered areas of fibroglandular density. FINDINGS: Bilateral digital screening mammography was performed and is  interpreted in conjunction with a computer assisted detection (CAD) system. Additionally, tomosynthesis of both breasts in the CC and MLO projections was  performed. No suspicious masses or calcifications are identified. There has been  no significant change. Impression  BI-RADS 1: Negative. No mammographic evidence of malignancy. RECOMMENDATIONS:  Next screening mammogram is recommended in one year. The patient will be notified of these results. ROS    Physical Exam  Constitutional:       Appearance: Normal appearance.    Chest:   Breasts:     Right: No mass, nipple discharge, skin change or tenderness. Left: No mass, nipple discharge, skin change or tenderness. Musculoskeletal:      Comments: FROM - UE x 2   Lymphadenopathy:      Upper Body:      Right upper body: No supraclavicular or axillary adenopathy. Left upper body: No supraclavicular or axillary adenopathy. Neurological:      Mental Status: She is alert. Psychiatric:         Attention and Perception: Attention normal.         Mood and Affect: Mood normal.         Speech: Speech normal.         Behavior: Behavior normal.       Visit Vitals  Ht 4' 11\" (1.499 m)   Wt 150 lb (68 kg)   LMP 08/15/2019   BMI 30.30 kg/m²       ASSESSMENT and PLAN    ICD-10-CM ICD-9-CM    1. Fibrocystic breast changes of both breasts  N60.11 610.1     N60.12        2. Atypical ductal hyperplasia of left breast  N60.92 610.8       3. Encounter for screening mammogram for breast cancer  Z12.31 V76.12 [facility] 3D JACOB W MAMMO BI SCREENING INCL CAD          Normal exam with no evidence of malignancy. ADH on biopsy in 2018, not excised and stable mammogram since then. BSmammogram 3D due next month. Previously discussed high risk screening MRIs. Will continue with mammograms since this was seen on mammogram and her breasts show scattered densities. RTC in 1 year or sooner PRN. Will consider PRN follow-up at that time or may opt to continue CBE here. She is comfortable with this plan. All questions answered and she stated understanding. Total time spent for this patient - 20 minutes.

## 2024-06-13 ENCOUNTER — HOSPITAL ENCOUNTER (OUTPATIENT)
Facility: HOSPITAL | Age: 64
Discharge: HOME | End: 2024-06-13
Attending: OBSTETRICS & GYNECOLOGY
Payer: COMMERCIAL

## 2024-06-13 VITALS — WEIGHT: 148 LBS | HEIGHT: 58 IN | BODY MASS INDEX: 31.07 KG/M2

## 2024-06-13 DIAGNOSIS — Z12.31 VISIT FOR SCREENING MAMMOGRAM: ICD-10-CM

## 2024-06-13 PROCEDURE — 77063 BREAST TOMOSYNTHESIS BI: CPT

## 2024-11-21 ENCOUNTER — HOSPITAL ENCOUNTER (INPATIENT)
Facility: HOSPITAL | Age: 64
LOS: 3 days | Discharge: HOME | DRG: 392 | End: 2024-11-24
Attending: EMERGENCY MEDICINE | Admitting: INTERNAL MEDICINE
Payer: COMMERCIAL

## 2024-11-21 ENCOUNTER — APPOINTMENT (OUTPATIENT)
Facility: HOSPITAL | Age: 64
DRG: 392 | End: 2024-11-21
Payer: COMMERCIAL

## 2024-11-21 DIAGNOSIS — K52.9 COLITIS: Primary | ICD-10-CM

## 2024-11-21 DIAGNOSIS — K44.9 HIATAL HERNIA: ICD-10-CM

## 2024-11-21 DIAGNOSIS — E87.6 HYPOKALEMIA: ICD-10-CM

## 2024-11-21 DIAGNOSIS — K62.5 HEMORRHAGE OF RECTUM AND ANUS: ICD-10-CM

## 2024-11-21 LAB
ABO + RH BLD: NORMAL
ALBUMIN SERPL-MCNC: 3.9 G/DL (ref 3.5–5)
ALBUMIN/GLOB SERPL: 1.2 (ref 1.1–2.2)
ALP SERPL-CCNC: 104 U/L (ref 45–117)
ALT SERPL-CCNC: 27 U/L (ref 12–78)
ANION GAP SERPL CALC-SCNC: 6 MMOL/L (ref 2–12)
ANION GAP SERPL CALC-SCNC: 8 MMOL/L (ref 2–12)
AST SERPL-CCNC: 17 U/L (ref 15–37)
BASOPHILS # BLD: 0.1 K/UL (ref 0–0.1)
BASOPHILS NFR BLD: 0 % (ref 0–1)
BILIRUB SERPL-MCNC: 1.1 MG/DL (ref 0.2–1)
BLOOD GROUP ANTIBODIES SERPL: NORMAL
BUN SERPL-MCNC: 14 MG/DL (ref 6–20)
BUN SERPL-MCNC: 17 MG/DL (ref 6–20)
BUN/CREAT SERPL: 16 (ref 12–20)
BUN/CREAT SERPL: 24 (ref 12–20)
CALCIUM SERPL-MCNC: 8.8 MG/DL (ref 8.5–10.1)
CALCIUM SERPL-MCNC: 9.3 MG/DL (ref 8.5–10.1)
CHLORIDE SERPL-SCNC: 101 MMOL/L (ref 97–108)
CHLORIDE SERPL-SCNC: 105 MMOL/L (ref 97–108)
CO2 SERPL-SCNC: 26 MMOL/L (ref 21–32)
CO2 SERPL-SCNC: 30 MMOL/L (ref 21–32)
COMMENT:: NORMAL
CREAT SERPL-MCNC: 0.71 MG/DL (ref 0.55–1.02)
CREAT SERPL-MCNC: 0.88 MG/DL (ref 0.55–1.02)
DIFFERENTIAL METHOD BLD: ABNORMAL
EOSINOPHIL # BLD: 0 K/UL (ref 0–0.4)
EOSINOPHIL NFR BLD: 0 % (ref 0–7)
ERYTHROCYTE [DISTWIDTH] IN BLOOD BY AUTOMATED COUNT: 12.8 % (ref 11.5–14.5)
GLOBULIN SER CALC-MCNC: 3.3 G/DL (ref 2–4)
GLUCOSE SERPL-MCNC: 122 MG/DL (ref 65–100)
GLUCOSE SERPL-MCNC: 157 MG/DL (ref 65–100)
HCT VFR BLD AUTO: 46.7 % (ref 35–47)
HGB BLD-MCNC: 15.7 G/DL (ref 11.5–16)
IMM GRANULOCYTES # BLD AUTO: 0.1 K/UL (ref 0–0.04)
IMM GRANULOCYTES NFR BLD AUTO: 1 % (ref 0–0.5)
LACTATE BLD-SCNC: 1.51 MMOL/L (ref 0.4–2)
LYMPHOCYTES # BLD: 0.9 K/UL (ref 0.8–3.5)
LYMPHOCYTES NFR BLD: 7 % (ref 12–49)
MCH RBC QN AUTO: 30.6 PG (ref 26–34)
MCHC RBC AUTO-ENTMCNC: 33.6 G/DL (ref 30–36.5)
MCV RBC AUTO: 91 FL (ref 80–99)
MONOCYTES # BLD: 1 K/UL (ref 0–1)
MONOCYTES NFR BLD: 8 % (ref 5–13)
NEUTS SEG # BLD: 10.6 K/UL (ref 1.8–8)
NEUTS SEG NFR BLD: 84 % (ref 32–75)
NRBC # BLD: 0 K/UL (ref 0–0.01)
NRBC BLD-RTO: 0 PER 100 WBC
PLATELET # BLD AUTO: 303 K/UL (ref 150–400)
PMV BLD AUTO: 10 FL (ref 8.9–12.9)
POTASSIUM SERPL-SCNC: 3.2 MMOL/L (ref 3.5–5.1)
POTASSIUM SERPL-SCNC: 3.2 MMOL/L (ref 3.5–5.1)
PROT SERPL-MCNC: 7.2 G/DL (ref 6.4–8.2)
RBC # BLD AUTO: 5.13 M/UL (ref 3.8–5.2)
SODIUM SERPL-SCNC: 137 MMOL/L (ref 136–145)
SODIUM SERPL-SCNC: 139 MMOL/L (ref 136–145)
SPECIMEN EXP DATE BLD: NORMAL
SPECIMEN HOLD: NORMAL
WBC # BLD AUTO: 12.6 K/UL (ref 3.6–11)

## 2024-11-21 PROCEDURE — 86901 BLOOD TYPING SEROLOGIC RH(D): CPT

## 2024-11-21 PROCEDURE — 87449 NOS EACH ORGANISM AG IA: CPT

## 2024-11-21 PROCEDURE — 85025 COMPLETE CBC W/AUTO DIFF WBC: CPT

## 2024-11-21 PROCEDURE — 2580000003 HC RX 258: Performed by: EMERGENCY MEDICINE

## 2024-11-21 PROCEDURE — 36415 COLL VENOUS BLD VENIPUNCTURE: CPT

## 2024-11-21 PROCEDURE — 96375 TX/PRO/DX INJ NEW DRUG ADDON: CPT

## 2024-11-21 PROCEDURE — 6360000002 HC RX W HCPCS: Performed by: INTERNAL MEDICINE

## 2024-11-21 PROCEDURE — 2580000003 HC RX 258: Performed by: INTERNAL MEDICINE

## 2024-11-21 PROCEDURE — 96374 THER/PROPH/DIAG INJ IV PUSH: CPT

## 2024-11-21 PROCEDURE — 87324 CLOSTRIDIUM AG IA: CPT

## 2024-11-21 PROCEDURE — 96376 TX/PRO/DX INJ SAME DRUG ADON: CPT

## 2024-11-21 PROCEDURE — 83605 ASSAY OF LACTIC ACID: CPT

## 2024-11-21 PROCEDURE — 96361 HYDRATE IV INFUSION ADD-ON: CPT

## 2024-11-21 PROCEDURE — 74177 CT ABD & PELVIS W/CONTRAST: CPT

## 2024-11-21 PROCEDURE — 99285 EMERGENCY DEPT VISIT HI MDM: CPT

## 2024-11-21 PROCEDURE — 6370000000 HC RX 637 (ALT 250 FOR IP): Performed by: EMERGENCY MEDICINE

## 2024-11-21 PROCEDURE — 80053 COMPREHEN METABOLIC PANEL: CPT

## 2024-11-21 PROCEDURE — 86850 RBC ANTIBODY SCREEN: CPT

## 2024-11-21 PROCEDURE — 6360000004 HC RX CONTRAST MEDICATION: Performed by: EMERGENCY MEDICINE

## 2024-11-21 PROCEDURE — 86900 BLOOD TYPING SEROLOGIC ABO: CPT

## 2024-11-21 PROCEDURE — 6370000000 HC RX 637 (ALT 250 FOR IP): Performed by: INTERNAL MEDICINE

## 2024-11-21 PROCEDURE — 1100000003 HC PRIVATE W/ TELEMETRY

## 2024-11-21 PROCEDURE — 6360000002 HC RX W HCPCS: Performed by: EMERGENCY MEDICINE

## 2024-11-21 RX ORDER — ONDANSETRON 4 MG/1
4 TABLET, ORALLY DISINTEGRATING ORAL EVERY 8 HOURS PRN
Status: DISCONTINUED | OUTPATIENT
Start: 2024-11-21 | End: 2024-11-24 | Stop reason: HOSPADM

## 2024-11-21 RX ORDER — POTASSIUM CHLORIDE 750 MG/1
10 CAPSULE, EXTENDED RELEASE ORAL DAILY
COMMUNITY

## 2024-11-21 RX ORDER — ONDANSETRON 2 MG/ML
4 INJECTION INTRAMUSCULAR; INTRAVENOUS
Status: COMPLETED | OUTPATIENT
Start: 2024-11-21 | End: 2024-11-21

## 2024-11-21 RX ORDER — PEDI MULTIVIT NO.25/FOLIC ACID 300 MCG
2 TABLET,CHEWABLE ORAL DAILY
COMMUNITY

## 2024-11-21 RX ORDER — POLYETHYLENE GLYCOL 3350 17 G/17G
17 POWDER, FOR SOLUTION ORAL DAILY PRN
Status: DISCONTINUED | OUTPATIENT
Start: 2024-11-21 | End: 2024-11-24 | Stop reason: HOSPADM

## 2024-11-21 RX ORDER — MORPHINE SULFATE 4 MG/ML
4 INJECTION, SOLUTION INTRAMUSCULAR; INTRAVENOUS ONCE
Status: COMPLETED | OUTPATIENT
Start: 2024-11-21 | End: 2024-11-21

## 2024-11-21 RX ORDER — SODIUM CHLORIDE 9 MG/ML
INJECTION, SOLUTION INTRAVENOUS PRN
Status: DISCONTINUED | OUTPATIENT
Start: 2024-11-21 | End: 2024-11-24 | Stop reason: HOSPADM

## 2024-11-21 RX ORDER — MORPHINE SULFATE 4 MG/ML
4 INJECTION, SOLUTION INTRAMUSCULAR; INTRAVENOUS EVERY 4 HOURS PRN
Status: COMPLETED | OUTPATIENT
Start: 2024-11-21 | End: 2024-11-21

## 2024-11-21 RX ORDER — POTASSIUM CHLORIDE 7.45 MG/ML
10 INJECTION INTRAVENOUS PRN
Status: DISCONTINUED | OUTPATIENT
Start: 2024-11-21 | End: 2024-11-24 | Stop reason: HOSPADM

## 2024-11-21 RX ORDER — AMLODIPINE BESYLATE 5 MG/1
5 TABLET ORAL DAILY
Status: DISCONTINUED | OUTPATIENT
Start: 2024-11-22 | End: 2024-11-24 | Stop reason: HOSPADM

## 2024-11-21 RX ORDER — ACETAMINOPHEN 325 MG/1
650 TABLET ORAL EVERY 6 HOURS PRN
Status: DISCONTINUED | OUTPATIENT
Start: 2024-11-21 | End: 2024-11-24 | Stop reason: HOSPADM

## 2024-11-21 RX ORDER — ACETAMINOPHEN 650 MG/1
650 SUPPOSITORY RECTAL EVERY 6 HOURS PRN
Status: DISCONTINUED | OUTPATIENT
Start: 2024-11-21 | End: 2024-11-24 | Stop reason: HOSPADM

## 2024-11-21 RX ORDER — ONDANSETRON 2 MG/ML
4 INJECTION INTRAMUSCULAR; INTRAVENOUS EVERY 6 HOURS PRN
Status: DISCONTINUED | OUTPATIENT
Start: 2024-11-21 | End: 2024-11-24 | Stop reason: HOSPADM

## 2024-11-21 RX ORDER — IOPAMIDOL 755 MG/ML
100 INJECTION, SOLUTION INTRAVASCULAR
Status: COMPLETED | OUTPATIENT
Start: 2024-11-21 | End: 2024-11-21

## 2024-11-21 RX ORDER — SODIUM CHLORIDE 0.9 % (FLUSH) 0.9 %
5-40 SYRINGE (ML) INJECTION EVERY 12 HOURS SCHEDULED
Status: DISCONTINUED | OUTPATIENT
Start: 2024-11-21 | End: 2024-11-24 | Stop reason: HOSPADM

## 2024-11-21 RX ORDER — SODIUM CHLORIDE 0.9 % (FLUSH) 0.9 %
5-40 SYRINGE (ML) INJECTION PRN
Status: DISCONTINUED | OUTPATIENT
Start: 2024-11-21 | End: 2024-11-24 | Stop reason: HOSPADM

## 2024-11-21 RX ORDER — MAGNESIUM SULFATE IN WATER 40 MG/ML
2000 INJECTION, SOLUTION INTRAVENOUS PRN
Status: DISCONTINUED | OUTPATIENT
Start: 2024-11-21 | End: 2024-11-24 | Stop reason: HOSPADM

## 2024-11-21 RX ORDER — ROSUVASTATIN CALCIUM 10 MG/1
10 TABLET, COATED ORAL NIGHTLY
Status: DISCONTINUED | OUTPATIENT
Start: 2024-11-21 | End: 2024-11-24 | Stop reason: HOSPADM

## 2024-11-21 RX ORDER — 0.9 % SODIUM CHLORIDE 0.9 %
500 INTRAVENOUS SOLUTION INTRAVENOUS ONCE
Status: COMPLETED | OUTPATIENT
Start: 2024-11-21 | End: 2024-11-21

## 2024-11-21 RX ORDER — POTASSIUM CHLORIDE 1500 MG/1
40 TABLET, EXTENDED RELEASE ORAL PRN
Status: DISCONTINUED | OUTPATIENT
Start: 2024-11-21 | End: 2024-11-24 | Stop reason: HOSPADM

## 2024-11-21 RX ADMIN — PIPERACILLIN AND TAZOBACTAM 3375 MG: 3; .375 INJECTION, POWDER, LYOPHILIZED, FOR SOLUTION INTRAVENOUS at 19:52

## 2024-11-21 RX ADMIN — PIPERACILLIN AND TAZOBACTAM 3375 MG: 3; .375 INJECTION, POWDER, LYOPHILIZED, FOR SOLUTION INTRAVENOUS at 14:39

## 2024-11-21 RX ADMIN — MORPHINE SULFATE 4 MG: 4 INJECTION, SOLUTION INTRAMUSCULAR; INTRAVENOUS at 11:40

## 2024-11-21 RX ADMIN — MORPHINE SULFATE 4 MG: 4 INJECTION, SOLUTION INTRAMUSCULAR; INTRAVENOUS at 20:20

## 2024-11-21 RX ADMIN — SODIUM CHLORIDE 500 ML: 9 INJECTION, SOLUTION INTRAVENOUS at 08:24

## 2024-11-21 RX ADMIN — MORPHINE SULFATE 4 MG: 4 INJECTION, SOLUTION INTRAMUSCULAR; INTRAVENOUS at 15:55

## 2024-11-21 RX ADMIN — IOPAMIDOL 100 ML: 755 INJECTION, SOLUTION INTRAVENOUS at 08:53

## 2024-11-21 RX ADMIN — SODIUM CHLORIDE, PRESERVATIVE FREE 10 ML: 5 INJECTION INTRAVENOUS at 20:26

## 2024-11-21 RX ADMIN — ONDANSETRON 4 MG: 2 INJECTION INTRAMUSCULAR; INTRAVENOUS at 11:39

## 2024-11-21 RX ADMIN — ROSUVASTATIN CALCIUM 10 MG: 10 TABLET, FILM COATED ORAL at 20:20

## 2024-11-21 RX ADMIN — POTASSIUM BICARBONATE 40 MEQ: 782 TABLET, EFFERVESCENT ORAL at 11:34

## 2024-11-21 RX ADMIN — ONDANSETRON 4 MG: 2 INJECTION INTRAMUSCULAR; INTRAVENOUS at 08:28

## 2024-11-21 ASSESSMENT — PAIN - FUNCTIONAL ASSESSMENT: PAIN_FUNCTIONAL_ASSESSMENT: ACTIVITIES ARE NOT PREVENTED

## 2024-11-21 ASSESSMENT — PAIN SCALES - GENERAL
PAINLEVEL_OUTOF10: 7
PAINLEVEL_OUTOF10: 8
PAINLEVEL_OUTOF10: 7
PAINLEVEL_OUTOF10: 3

## 2024-11-21 ASSESSMENT — PAIN DESCRIPTION - ORIENTATION
ORIENTATION: LEFT;LOWER
ORIENTATION: LEFT

## 2024-11-21 ASSESSMENT — PAIN DESCRIPTION - DESCRIPTORS
DESCRIPTORS: ACHING;SHARP;CRAMPING
DESCRIPTORS: SHARP

## 2024-11-21 ASSESSMENT — PAIN DESCRIPTION - LOCATION
LOCATION: ABDOMEN
LOCATION: ABDOMEN

## 2024-11-21 NOTE — H&P
History & Physical    Primary Care Provider: Kaya Wiley MD  Source of Information: Patient/family     Chief complaint:   Chief Complaint   Patient presents with    Rectal Bleeding    Abdominal Pain     Pt ambulatory to room cc of LLQ pain, vomiting, and a few episodes of bloody stool this am. Pt was recently dxd with a hernia in the LLQ, denies CP, SOB, fevers at home. States her first BM had dark red blood and the second had bright red blood. No blood thinners      History of present illness    64-year-old lady with history of essential hypertension hyperlipidemia presents to the ED with complaints of left lower quadrant pain.  Pain described as sharp without radiation.  Associated with loose watery bloody stools x 2.  No reported fevers or chills.  No recent travel.  No new foods.  No prior history of similar issues.  CT abdomen pelvis showed left descending colitis.  No prior colonoscopies.  Workup in the ED showed a normal lactate level of 1.5, WBC count of 12.6 with a left shift.  Pain relieved with IV morphine.  Will be admitted for IV antibiotics and GI evaluation.        Review of Systems:  A comprehensive review of systems was negative except for that written in the History of Present Illness.     Past Medical History:   Diagnosis Date    Hyperlipidemia     Hypertension         Past Surgical History:   Procedure Laterality Date    BREAST BIOPSY Left 07/25/2018    [facility]- pt declined excisional bx.    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 Authorizing Provider   potassium chloride (MICRO-K) 10 MEQ extended release capsule Take 1 capsule by mouth daily   Yes Provider, MD Kamari   Pediatric Multiple Vitamins (PEDIATRIC MULTIVITAMIN) chewable tablet Take 2 tablets by mouth daily   Yes

## 2024-11-21 NOTE — CONSULTS
colitis    Colitis       REVIEW OF SYSTEMS:    Constitutional: negative fever, negative chills, negative weight loss  Eyes:   negative visual changes  ENT:   negative sore throat, tongue or lip swelling   Respiratory:  negative cough, negative dyspnea  Cards:  negative for chest pain, palpitations, lower extremity edema  GI:   See HPI  :  negative for frequency, dysuria  Integument:  negative for rash and pruritus  Heme:  negative for easy bruising and gum/nose bleeding  Musculoskel: negative for myalgias,  back pain and muscle weakness  Neuro: negative for headaches, dizziness, vertigo  Psych: negative for feelings of anxiety, depression   Objective:   VITALS:    Vitals:    11/21/24 1430   BP: (!) 151/82   Pulse: 71   Resp: 14   Temp:    SpO2: 91%       PHYSICAL EXAM:   General:          Alert, WD, WN, cooperative, no distress, appears stated age.    Head:               Normocephalic, without obvious abnormality, atraumatic.  Eyes:               Conjunctivae clear and pale, anicteric sclerae. Abdomen:        Soft, very mild LLQ TTP. Not distended.  Bowel sounds normal. No masses  Extremities:     Atraumatic, No cyanosis.  No edema. No clubbing  Skin:                Texture, turgor normal. No rashes/lesions/jaundice  Lymph:            Cervical, supraclavicular normal.  Psych:             Good insight.  Not depressed.  Not anxious or agitated.  Neurologic:      EOMs intact. No facial asymmetry. No aphasia or slurred speech. Normal                        strength, A/O X 3.     LAB DATA REVIEWED:    Recent Results (from the past 24 hour(s))   CBC with Auto Differential    Collection Time: 11/21/24  8:34 AM   Result Value Ref Range    WBC 12.6 (H) 3.6 - 11.0 K/uL    RBC 5.13 3.80 - 5.20 M/uL    Hemoglobin 15.7 11.5 - 16.0 g/dL    Hematocrit 46.7 35.0 - 47.0 %    MCV 91.0 80.0 - 99.0 FL    MCH 30.6 26.0 - 34.0 PG    MCHC 33.6 30.0 - 36.5 g/dL    RDW 12.8 11.5 - 14.5 %    Platelets 303 150 - 400 K/uL    MPV 10.0 8.9 -

## 2024-11-22 LAB
BASOPHILS # BLD: 0 K/UL (ref 0–0.1)
BASOPHILS NFR BLD: 0 % (ref 0–1)
DIFFERENTIAL METHOD BLD: ABNORMAL
EOSINOPHIL # BLD: 0.1 K/UL (ref 0–0.4)
EOSINOPHIL NFR BLD: 1 % (ref 0–7)
ERYTHROCYTE [DISTWIDTH] IN BLOOD BY AUTOMATED COUNT: 12.9 % (ref 11.5–14.5)
HCT VFR BLD AUTO: 41.6 % (ref 35–47)
HGB BLD-MCNC: 13.7 G/DL (ref 11.5–16)
IMM GRANULOCYTES # BLD AUTO: 0.1 K/UL (ref 0–0.04)
IMM GRANULOCYTES NFR BLD AUTO: 1 % (ref 0–0.5)
LYMPHOCYTES # BLD: 1.6 K/UL (ref 0.8–3.5)
LYMPHOCYTES NFR BLD: 12 % (ref 12–49)
MCH RBC QN AUTO: 30.7 PG (ref 26–34)
MCHC RBC AUTO-ENTMCNC: 32.9 G/DL (ref 30–36.5)
MCV RBC AUTO: 93.3 FL (ref 80–99)
MONOCYTES # BLD: 1.2 K/UL (ref 0–1)
MONOCYTES NFR BLD: 9 % (ref 5–13)
NEUTS SEG # BLD: 10.1 K/UL (ref 1.8–8)
NEUTS SEG NFR BLD: 77 % (ref 32–75)
NRBC # BLD: 0 K/UL (ref 0–0.01)
NRBC BLD-RTO: 0 PER 100 WBC
PLATELET # BLD AUTO: 234 K/UL (ref 150–400)
PMV BLD AUTO: 10.1 FL (ref 8.9–12.9)
RBC # BLD AUTO: 4.46 M/UL (ref 3.8–5.2)
WBC # BLD AUTO: 13.1 K/UL (ref 3.6–11)

## 2024-11-22 PROCEDURE — 87506 IADNA-DNA/RNA PROBE TQ 6-11: CPT

## 2024-11-22 PROCEDURE — 1100000003 HC PRIVATE W/ TELEMETRY

## 2024-11-22 PROCEDURE — 36415 COLL VENOUS BLD VENIPUNCTURE: CPT

## 2024-11-22 PROCEDURE — 85025 COMPLETE CBC W/AUTO DIFF WBC: CPT

## 2024-11-22 PROCEDURE — 2580000003 HC RX 258: Performed by: INTERNAL MEDICINE

## 2024-11-22 PROCEDURE — 6360000002 HC RX W HCPCS: Performed by: INTERNAL MEDICINE

## 2024-11-22 PROCEDURE — 6370000000 HC RX 637 (ALT 250 FOR IP): Performed by: INTERNAL MEDICINE

## 2024-11-22 RX ORDER — POTASSIUM CHLORIDE 1500 MG/1
40 TABLET, EXTENDED RELEASE ORAL ONCE
Status: COMPLETED | OUTPATIENT
Start: 2024-11-22 | End: 2024-11-22

## 2024-11-22 RX ORDER — FERROUS SULFATE 325(65) MG
325 TABLET ORAL 2 TIMES DAILY WITH MEALS
Status: DISCONTINUED | OUTPATIENT
Start: 2024-11-22 | End: 2024-11-24 | Stop reason: HOSPADM

## 2024-11-22 RX ORDER — VITAMIN B COMPLEX
1000 TABLET ORAL DAILY
Status: DISCONTINUED | OUTPATIENT
Start: 2024-11-22 | End: 2024-11-24 | Stop reason: HOSPADM

## 2024-11-22 RX ADMIN — FERROUS SULFATE TAB 325 MG (65 MG ELEMENTAL FE) 325 MG: 325 (65 FE) TAB at 16:20

## 2024-11-22 RX ADMIN — SODIUM CHLORIDE, PRESERVATIVE FREE 10 ML: 5 INJECTION INTRAVENOUS at 08:47

## 2024-11-22 RX ADMIN — PIPERACILLIN AND TAZOBACTAM 3375 MG: 3; .375 INJECTION, POWDER, LYOPHILIZED, FOR SOLUTION INTRAVENOUS at 20:32

## 2024-11-22 RX ADMIN — ROSUVASTATIN CALCIUM 10 MG: 10 TABLET, FILM COATED ORAL at 20:28

## 2024-11-22 RX ADMIN — POTASSIUM CHLORIDE 40 MEQ: 1500 TABLET, EXTENDED RELEASE ORAL at 16:20

## 2024-11-22 RX ADMIN — ACETAMINOPHEN 325MG 650 MG: 325 TABLET ORAL at 09:44

## 2024-11-22 RX ADMIN — FLUOXETINE HYDROCHLORIDE 40 MG: 20 CAPSULE ORAL at 08:45

## 2024-11-22 RX ADMIN — AMLODIPINE BESYLATE 5 MG: 5 TABLET ORAL at 08:45

## 2024-11-22 RX ADMIN — ACETAMINOPHEN 325MG 650 MG: 325 TABLET ORAL at 01:20

## 2024-11-22 RX ADMIN — ACETAMINOPHEN 325MG 650 MG: 325 TABLET ORAL at 20:28

## 2024-11-22 RX ADMIN — Medication 1000 UNITS: at 16:20

## 2024-11-22 RX ADMIN — PIPERACILLIN AND TAZOBACTAM 3375 MG: 3; .375 INJECTION, POWDER, LYOPHILIZED, FOR SOLUTION INTRAVENOUS at 05:08

## 2024-11-22 RX ADMIN — PIPERACILLIN AND TAZOBACTAM 3375 MG: 3; .375 INJECTION, POWDER, LYOPHILIZED, FOR SOLUTION INTRAVENOUS at 14:16

## 2024-11-22 RX ADMIN — SODIUM CHLORIDE, PRESERVATIVE FREE 10 ML: 5 INJECTION INTRAVENOUS at 20:31

## 2024-11-22 ASSESSMENT — PAIN DESCRIPTION - LOCATION
LOCATION: ABDOMEN

## 2024-11-22 ASSESSMENT — PAIN DESCRIPTION - DESCRIPTORS
DESCRIPTORS: ACHING;CRAMPING
DESCRIPTORS: STABBING
DESCRIPTORS: ACHING;CRAMPING

## 2024-11-22 ASSESSMENT — PAIN SCALES - GENERAL
PAINLEVEL_OUTOF10: 5
PAINLEVEL_OUTOF10: 3
PAINLEVEL_OUTOF10: 4
PAINLEVEL_OUTOF10: 2

## 2024-11-22 ASSESSMENT — PAIN DESCRIPTION - ORIENTATION
ORIENTATION: LEFT;LOWER
ORIENTATION: LOWER;LEFT
ORIENTATION: LEFT;LOWER

## 2024-11-22 ASSESSMENT — PAIN - FUNCTIONAL ASSESSMENT
PAIN_FUNCTIONAL_ASSESSMENT: ACTIVITIES ARE NOT PREVENTED
PAIN_FUNCTIONAL_ASSESSMENT: ACTIVITIES ARE NOT PREVENTED

## 2024-11-22 NOTE — PLAN OF CARE
Problem: Discharge Planning  Goal: Discharge to home or other facility with appropriate resources  11/21/2024 2057 by Terra Babb RN  Outcome: Progressing  11/21/2024 2012 by Kinsey Recinos LPN  Outcome: Progressing     Problem: Pain  Goal: Verbalizes/displays adequate comfort level or baseline comfort level  11/21/2024 2057 by Terra Babb RN  Outcome: Progressing  11/21/2024 2012 by Kinsey Recinos LPN  Outcome: Progressing

## 2024-11-23 LAB
ANION GAP SERPL CALC-SCNC: 3 MMOL/L (ref 2–12)
BASOPHILS # BLD: 0.1 K/UL (ref 0–0.1)
BASOPHILS NFR BLD: 1 % (ref 0–1)
BUN SERPL-MCNC: 15 MG/DL (ref 6–20)
BUN/CREAT SERPL: 25 (ref 12–20)
C COLI+JEJUNI TUF STL QL NAA+PROBE: NEGATIVE
C DIFF GDH STL QL: NEGATIVE
C DIFF TOX A+B STL QL IA: NEGATIVE
C DIFF TOXIN INTERPRETATION: NORMAL
CALCIUM SERPL-MCNC: 8.8 MG/DL (ref 8.5–10.1)
CHLORIDE SERPL-SCNC: 108 MMOL/L (ref 97–108)
CO2 SERPL-SCNC: 29 MMOL/L (ref 21–32)
CREAT SERPL-MCNC: 0.6 MG/DL (ref 0.55–1.02)
DIFFERENTIAL METHOD BLD: NORMAL
EC STX1+STX2 GENES STL QL NAA+PROBE: NEGATIVE
EOSINOPHIL # BLD: 0.2 K/UL (ref 0–0.4)
EOSINOPHIL NFR BLD: 2 % (ref 0–7)
ERYTHROCYTE [DISTWIDTH] IN BLOOD BY AUTOMATED COUNT: 12.8 % (ref 11.5–14.5)
ETEC ELTA+ESTB GENES STL QL NAA+PROBE: NEGATIVE
GLUCOSE SERPL-MCNC: 120 MG/DL (ref 65–100)
HCT VFR BLD AUTO: 39 % (ref 35–47)
HGB BLD-MCNC: 12.8 G/DL (ref 11.5–16)
IMM GRANULOCYTES # BLD AUTO: 0 K/UL (ref 0–0.04)
IMM GRANULOCYTES NFR BLD AUTO: 0 % (ref 0–0.5)
LYMPHOCYTES # BLD: 1.8 K/UL (ref 0.8–3.5)
LYMPHOCYTES NFR BLD: 20 % (ref 12–49)
MCH RBC QN AUTO: 30.7 PG (ref 26–34)
MCHC RBC AUTO-ENTMCNC: 32.8 G/DL (ref 30–36.5)
MCV RBC AUTO: 93.5 FL (ref 80–99)
MONOCYTES # BLD: 0.7 K/UL (ref 0–1)
MONOCYTES NFR BLD: 8 % (ref 5–13)
NEUTS SEG # BLD: 6.3 K/UL (ref 1.8–8)
NEUTS SEG NFR BLD: 69 % (ref 32–75)
NRBC # BLD: 0 K/UL (ref 0–0.01)
NRBC BLD-RTO: 0 PER 100 WBC
P SHIGELLOIDES DNA STL QL NAA+PROBE: NEGATIVE
PLATELET # BLD AUTO: 199 K/UL (ref 150–400)
PMV BLD AUTO: 9.9 FL (ref 8.9–12.9)
POTASSIUM SERPL-SCNC: 3.2 MMOL/L (ref 3.5–5.1)
RBC # BLD AUTO: 4.17 M/UL (ref 3.8–5.2)
SALMONELLA SP SPAO STL QL NAA+PROBE: NEGATIVE
SHIGELLA SP+EIEC IPAH STL QL NAA+PROBE: NEGATIVE
SODIUM SERPL-SCNC: 140 MMOL/L (ref 136–145)
V CHOL+PARA+VUL DNA STL QL NAA+NON-PROBE: NEGATIVE
WBC # BLD AUTO: 9.1 K/UL (ref 3.6–11)
Y ENTEROCOL DNA STL QL NAA+NON-PROBE: NEGATIVE

## 2024-11-23 PROCEDURE — 1100000003 HC PRIVATE W/ TELEMETRY

## 2024-11-23 PROCEDURE — 6370000000 HC RX 637 (ALT 250 FOR IP): Performed by: INTERNAL MEDICINE

## 2024-11-23 PROCEDURE — 80048 BASIC METABOLIC PNL TOTAL CA: CPT

## 2024-11-23 PROCEDURE — 85025 COMPLETE CBC W/AUTO DIFF WBC: CPT

## 2024-11-23 PROCEDURE — 6360000002 HC RX W HCPCS: Performed by: INTERNAL MEDICINE

## 2024-11-23 PROCEDURE — 2580000003 HC RX 258: Performed by: INTERNAL MEDICINE

## 2024-11-23 PROCEDURE — 36415 COLL VENOUS BLD VENIPUNCTURE: CPT

## 2024-11-23 RX ADMIN — POTASSIUM CHLORIDE 40 MEQ: 1500 TABLET, EXTENDED RELEASE ORAL at 14:31

## 2024-11-23 RX ADMIN — PIPERACILLIN AND TAZOBACTAM 3375 MG: 3; .375 INJECTION, POWDER, LYOPHILIZED, FOR SOLUTION INTRAVENOUS at 14:36

## 2024-11-23 RX ADMIN — SODIUM CHLORIDE, PRESERVATIVE FREE 5 ML: 5 INJECTION INTRAVENOUS at 20:34

## 2024-11-23 RX ADMIN — ACETAMINOPHEN 325MG 650 MG: 325 TABLET ORAL at 08:16

## 2024-11-23 RX ADMIN — PIPERACILLIN AND TAZOBACTAM 3375 MG: 3; .375 INJECTION, POWDER, LYOPHILIZED, FOR SOLUTION INTRAVENOUS at 04:57

## 2024-11-23 RX ADMIN — FERROUS SULFATE TAB 325 MG (65 MG ELEMENTAL FE) 325 MG: 325 (65 FE) TAB at 08:17

## 2024-11-23 RX ADMIN — SODIUM CHLORIDE: 9 INJECTION, SOLUTION INTRAVENOUS at 22:25

## 2024-11-23 RX ADMIN — AMLODIPINE BESYLATE 5 MG: 5 TABLET ORAL at 08:17

## 2024-11-23 RX ADMIN — Medication 1000 UNITS: at 08:17

## 2024-11-23 RX ADMIN — PIPERACILLIN AND TAZOBACTAM 3375 MG: 3; .375 INJECTION, POWDER, LYOPHILIZED, FOR SOLUTION INTRAVENOUS at 22:25

## 2024-11-23 RX ADMIN — FERROUS SULFATE TAB 325 MG (65 MG ELEMENTAL FE) 325 MG: 325 (65 FE) TAB at 17:44

## 2024-11-23 RX ADMIN — ROSUVASTATIN CALCIUM 10 MG: 10 TABLET, FILM COATED ORAL at 20:34

## 2024-11-23 RX ADMIN — SODIUM CHLORIDE, PRESERVATIVE FREE 10 ML: 5 INJECTION INTRAVENOUS at 14:37

## 2024-11-23 RX ADMIN — FLUOXETINE HYDROCHLORIDE 40 MG: 20 CAPSULE ORAL at 08:17

## 2024-11-23 ASSESSMENT — PAIN SCALES - GENERAL
PAINLEVEL_OUTOF10: 2
PAINLEVEL_OUTOF10: 3
PAINLEVEL_OUTOF10: 4
PAINLEVEL_OUTOF10: 2
PAINLEVEL_OUTOF10: 0

## 2024-11-23 ASSESSMENT — PAIN DESCRIPTION - LOCATION
LOCATION: ABDOMEN
LOCATION: ABDOMEN

## 2024-11-23 ASSESSMENT — PAIN DESCRIPTION - ORIENTATION
ORIENTATION: LEFT;LOWER
ORIENTATION: LEFT;LOWER

## 2024-11-23 ASSESSMENT — PAIN DESCRIPTION - DESCRIPTORS
DESCRIPTORS: ACHING;CRAMPING
DESCRIPTORS: ACHING;CRAMPING

## 2024-11-23 NOTE — PLAN OF CARE
Problem: Discharge Planning  Goal: Discharge to home or other facility with appropriate resources  Outcome: Progressing     Problem: Pain  Goal: Verbalizes/displays adequate comfort level or baseline comfort level  11/22/2024 2134 by Terra Babb RN  Outcome: Progressing  11/22/2024 1115 by Catherine Casas LPN  Outcome: Progressing

## 2024-11-24 VITALS
TEMPERATURE: 98.4 F | RESPIRATION RATE: 20 BRPM | OXYGEN SATURATION: 97 % | DIASTOLIC BLOOD PRESSURE: 74 MMHG | SYSTOLIC BLOOD PRESSURE: 130 MMHG | HEART RATE: 63 BPM

## 2024-11-24 PROCEDURE — 6360000002 HC RX W HCPCS: Performed by: INTERNAL MEDICINE

## 2024-11-24 PROCEDURE — 6370000000 HC RX 637 (ALT 250 FOR IP): Performed by: INTERNAL MEDICINE

## 2024-11-24 PROCEDURE — 2580000003 HC RX 258: Performed by: INTERNAL MEDICINE

## 2024-11-24 RX ORDER — CIPROFLOXACIN 500 MG/1
500 TABLET, FILM COATED ORAL 2 TIMES DAILY
Qty: 14 TABLET | Refills: 0 | Status: SHIPPED | OUTPATIENT
Start: 2024-11-24 | End: 2024-12-01

## 2024-11-24 RX ORDER — METRONIDAZOLE 500 MG/1
500 TABLET ORAL 2 TIMES DAILY
Qty: 14 TABLET | Refills: 0 | Status: SHIPPED | OUTPATIENT
Start: 2024-11-24 | End: 2024-12-01

## 2024-11-24 RX ORDER — POTASSIUM CHLORIDE 1500 MG/1
60 TABLET, EXTENDED RELEASE ORAL ONCE
Status: COMPLETED | OUTPATIENT
Start: 2024-11-24 | End: 2024-11-24

## 2024-11-24 RX ADMIN — FLUOXETINE HYDROCHLORIDE 40 MG: 20 CAPSULE ORAL at 08:49

## 2024-11-24 RX ADMIN — SODIUM CHLORIDE: 9 INJECTION, SOLUTION INTRAVENOUS at 06:22

## 2024-11-24 RX ADMIN — SODIUM CHLORIDE, PRESERVATIVE FREE 10 ML: 5 INJECTION INTRAVENOUS at 08:53

## 2024-11-24 RX ADMIN — Medication 1000 UNITS: at 08:49

## 2024-11-24 RX ADMIN — AMLODIPINE BESYLATE 5 MG: 5 TABLET ORAL at 08:50

## 2024-11-24 RX ADMIN — FERROUS SULFATE TAB 325 MG (65 MG ELEMENTAL FE) 325 MG: 325 (65 FE) TAB at 08:50

## 2024-11-24 RX ADMIN — POTASSIUM CHLORIDE 60 MEQ: 1500 TABLET, EXTENDED RELEASE ORAL at 08:50

## 2024-11-24 RX ADMIN — PIPERACILLIN AND TAZOBACTAM 3375 MG: 3; .375 INJECTION, POWDER, LYOPHILIZED, FOR SOLUTION INTRAVENOUS at 06:23

## 2024-11-24 NOTE — PROGRESS NOTES
End of Shift Note    Bedside shift change report given to MARIAN Ellison (oncoming nurse) by Catherine Casas LPN (offgoing nurse).  Report included the following information SBAR, MAR, and Cardiac Rhythm NSR    Shift worked:  7a - 7p      Shift summary and any significant changes:     Patient had x1 bowel movement (Red/brown). New 22g IV placed in right forearm.      Concerns for physician to address:  Red stool     Zone phone for oncoming shift:   [phone]       Activity:     Number times ambulated in hallways past shift: 0  Number of times OOB to chair past shift: 1    Cardiac:   Cardiac Monitoring: Yes           Access:  Current line(s): PIV     Genitourinary:   Urinary status: voiding    Respiratory:      Chronic home O2 use?: NO  Incentive spirometer at bedside: NO       GI:     Current diet:  ADULT DIET; Regular; GI [place] (GERD/Peptic Ulcer); No Meat  Passing flatus: YES  Tolerating current diet: YES       Pain Management:   Patient states pain is manageable on current regimen: YES    Skin:     Interventions: increase time out of bed and limit briefs    Patient Safety:  Fall Score:    Interventions: gripper socks       Length of Stay:  Expected LOS: 2  Actual LOS: 1      Catherine Casas LPN                           
End of Shift Note    Bedside shift change report given to MARIAN Ellison (oncoming nurse) by Kinsey Recinos LPN (offgoing nurse).  Report included the following information SBAR, Intake/Output, MAR, Recent Results, Cardiac Rhythm NSR/Sinus Tachy, and Alarm Parameters     Shift worked: 7A to 7P   Shift summary and any significant changes:    Morphine x1 given for pain.  Pt reported passing stool. Sample was diluted with urine and only consisted of only blood clots. Enteric panel and C-diff samples to be collected.   Pharmacy contacted regarding collection of STAT Lactic due @ 1030. BMP collected.  Pt calm and cooperative.  at bedside at evening shift change.  MD made aware of Zosyn infusion given in ED. Zosyn infusing at shift change.  Plan of care ongoing.       Concerns for physician to address: None   Zone phone for oncoming shift:  2129     Activity:     Number times ambulated in hallways past shift: 0  Number of times OOB to chair past shift: 0    Cardiac:   Cardiac Monitoring: Yes           Access:  Current line(s): PIV     Genitourinary:   Urinary status: voiding    Respiratory:      Chronic home O2 use?: NO  Incentive spirometer at bedside: NO       GI:     Current diet:  ADULT DIET; Full Liquid  Passing flatus: YES  Tolerating current diet: YES       Pain Management:   Patient states pain is manageable on current regimen: YES    Skin:     Interventions: float heels and increase time out of bed    Patient Safety:  Fall Score:    Interventions: gripper socks       Length of Stay:  Expected LOS: 2  Actual LOS: 0      Kinsey Recinos LPN                           
End of Shift Note    Bedside shift change report given to MARIAN Rojas (oncoming nurse) by Catherine Casas LPN (offgoing nurse).  Report included the following information SBAR and MAR    Shift worked:  7a - 7p     Shift summary and any significant changes:     C diff came back negative. Patients pain has been well controlled. No LLQ pain at this time. No bowel movement.      Concerns for physician to address:  No concerns     Zone phone for oncoming shift:   2123       Activity:  Level of Assistance: Independent  Number times ambulated in hallways past shift: 0  Number of times OOB to chair past shift: 0    Cardiac:   Cardiac Monitoring: No           Access:  Current line(s): PIV     Genitourinary:   Urinary Status: Voiding, Bathroom privileges    Respiratory:   O2 Device: None (Room air)  Chronic home O2 use?: NO  Incentive spirometer at bedside: NO    GI:  Last BM (including prior to admit): 11/22/24  Current diet:  ADULT DIET; Regular; GI [place] (GERD/Peptic Ulcer); No Meat  Passing flatus: YES    Pain Management:   Patient states pain is manageable on current regimen: YES    Skin:  Ra Scale Score: 23  Interventions: Wound Offloading (Prevention Methods): Repositioning    Patient Safety:  Fall Risk: Nursing Judgement-Fall Risk High(Add Comments): No  Fall Risk Interventions  Nursing Judgement-Fall Risk High(Add Comments): No  Toilet Every 2 Hours-In Advance of Need: No (Comment) (Independent)  Hourly Visual Checks: Awake, In bed  Fall Visual Posted: Socks  Room Door Open: Deferred to decrease stimulation  Alarm On: Other (Comment) (Independent)  Patient Moved Closer to Nursing Station: No    Active Consults:   IP CONSULT TO HOSPITALIST  IP CONSULT TO GI    Length of Stay:  Expected LOS: 2  Actual LOS: 2    Catherine Casas LPN                           
End of Shift Note    Bedside shift change report given to Prince ARELLANO (oncoming nurse) by Terra Babb RN (offgoing nurse).  Report included the following information SBAR REPORTS LIST: SBAR, ED Summary, OR Summary, Procedure Summary, Intake/Output, MAR, Recent Results, Med Rec Status, and Cardiac Rhythm (NSR)    Shift worked:  8604-3453     Shift summary and any significant changes:     Pt had a calm night, no complaints made at this time, LLQ pain well controlled over night.     Concerns for physician to address: Patient would like to speak to the physician this morning.     [facility]   [facility]           Length of Stay:  Expected LOS: 2  Actual LOS: 1      Terra Babb RN                            
End of Shift Note    Bedside shift change report given to Prince ARELLANO (oncoming nurse) by Terra Babb RN (offgoing nurse).  Report included the following information SBAR REPORTS LIST: SBAR, Kardex, ED Summary, OR Summary, Procedure Summary, Intake/Output, MAR, Accordion, Recent Results, Med Rec Status, and Cardiac Rhythm (NSR)    Shift worked:  4873-9799     Shift summary and any significant changes:     Pt complained of intermittent abdominal pain and Prn morphine and tylenol were used for pain control over the night. Stool sample for C-diff still pending because pt did not actively have loose stools over the night. Pt had a small BM (mucus like) this morning was bloody and Enteric panel sample was taken. Pt was made comfortable.     Concerns for physician to address:  None     Zone phone for oncoming shift:   2129           Length of Stay:  Expected LOS: 2  Actual LOS: 0      Terra Babb RN                            
Pharmacy Medication History Review    Medication history obtained by Casi Davenport while patient was in room ER12/12 and was completed based on information available during current patient encounter. Medication history was completed before home meds were reconciled by provider.    Pharmacist Admission Medication Reconciliation Recommendations:   Resume lisinopril/hctz when appropriate  K supplementation - in process         PTA medication list was corrected to the following:   Prior to Admission Medications   Prescriptions Last Dose Informant   FLUoxetine (PROZAC) 40 MG capsule 11/20/2024 Self   Sig: Take 1 capsule by mouth daily   Pediatric Multiple Vitamins (PEDIATRIC MULTIVITAMIN) chewable tablet 11/20/2024 Self   Sig: Take 2 tablets by mouth daily   amLODIPine (NORVASC) 5 MG tablet 11/20/2024 Self   Sig: TAKE 1 TABLET BY MOUTH EVERY DAY FOR 90 DAYS   aspirin 81 MG chewable tablet 11/20/2024 Self   Sig: Take 1 tablet by mouth daily   lisinopril-hydroCHLOROthiazide (PRINZIDE;ZESTORETIC) 20-25 MG per tablet 11/20/2024 Self   Sig: Take 1 tablet by mouth daily   potassium chloride (MICRO-K) 10 MEQ extended release capsule Past Month Self   Sig: Take 1 capsule by mouth daily   rosuvastatin (CRESTOR) 10 MG tablet 11/20/2024 Self   Sig: ceived the following from [facility]: Outside name: rosuvastatin (CRESTOR) 10 mg tablet      Facility-Administered Medications: None         Pertinent Information:      The following medications have been added:   + potassium chl  + children's multi vit  The following medications have been removed: none  The following medications have been modified:   Fluoxetine OLD: 20mg 1 tablet daily NEW: 40mg 1 tablet daily  The patient takes the following medications differently than prescribed: none    Medication history obtained from: Family member , Patient, and Rx Query/dispense report    Who takes care of medications prior to arrival: Patient  Able to recall: Name of 
Pt discharged to home with all belongings. AVS reviewed with pt and spouse at bedside. IV removed.  
mg  5 mg Oral Daily    FLUoxetine (PROZAC) capsule 40 mg  40 mg Oral Daily    rosuvastatin (CRESTOR) tablet 10 mg  10 mg Oral Nightly    sodium chloride flush 0.9 % injection 5-40 mL  5-40 mL IntraVENous 2 times per day    sodium chloride flush 0.9 % injection 5-40 mL  5-40 mL IntraVENous PRN    0.9 % sodium chloride infusion   IntraVENous PRN    potassium chloride (KLOR-CON M) extended release tablet 40 mEq  40 mEq Oral PRN    Or    potassium bicarb-citric acid (EFFER-K) effervescent tablet 40 mEq  40 mEq Oral PRN    Or    potassium chloride 10 mEq/100 mL IVPB (Peripheral Line)  10 mEq IntraVENous PRN    magnesium sulfate 2000 mg in 50 mL IVPB premix  2,000 mg IntraVENous PRN    ondansetron (ZOFRAN-ODT) disintegrating tablet 4 mg  4 mg Oral Q8H PRN    Or    ondansetron (ZOFRAN) injection 4 mg  4 mg IntraVENous Q6H PRN    polyethylene glycol (GLYCOLAX) packet 17 g  17 g Oral Daily PRN    acetaminophen (TYLENOL) tablet 650 mg  650 mg Oral Q6H PRN    Or    acetaminophen (TYLENOL) suppository 650 mg  650 mg Rectal Q6H PRN    piperacillin-tazobactam (ZOSYN) 3,375 mg in sodium chloride 0.9 % 50 mL IVPB (mini-bag)  3,375 mg IntraVENous 3 times per day        
electrolyte abnormalities requiring IV replacement and close serial monitoring  [] SQ Insulin SS- monitoring serial FSBS for Hypoglycemic adverse drug reaction  [] Other -   [] Change in code status:    [] Decision to escalate care:    [] Major surgery/procedure with associated risk factors:    ----------------------------------------------------------------------  C. Data (any 2)  [] Discussed current management and discharge planning options with Case Management.  [] Discussed management of the case with:    [x] Telemetry personally reviewed and interpreted as documented above    [] Imaging personally reviewed and interpreted, includes:    [] Data Review (any 3)  [x] All available Consultant notes from yesterday/today were reviewed  [] All current labs were reviewed and interpreted for clinical significance   [] Appropriate follow-up labs were ordered  [] Collateral history obtained from:               Assessment:  Acute colitis of the descending colon  -- Rule out infectious cause. Stool for C diff pending  -- Continue empiric IV Zosyn 3.375 every 6 hours  -- Begin full GI soft diet and monitor stool output  -- Appreciate GI input  -- If overt rectal bleeding, GI may consider inpatient colonoscopy     Essential hypertension  -- Fairly stable  -- Hold hydrochlorothiazide     Hyperlipidemia  -- Fairly stable     Hypokalemia  -- Repleted             Plan:    Continue supportive care  Monitor H&H closely  Follow-up stool for C. difficile      Code status:    Social determinants of health: none      Estimated discharge date//time frame/disposition: November 24 to home    Barriers to discharge: Improved colitis          Chai Azevedo MD  
none      Estimated discharge date//time frame/disposition: November 23 to home    Barriers to discharge: Improved colitis          Chai Azevedo MD

## 2024-11-24 NOTE — DISCHARGE SUMMARY
Physician Discharge Summary     Patient ID:    Marcia Reddy  [id number]  64 y.o.  1960    Admit date: 11/21/2024    Discharge date : 11/24/2024      Final Diagnoses:   Hemorrhage of rectum and anus [K62.5]  Hiatal hernia [K44.9]  Hypokalemia [E87.6]  Colitis [K52.9]  Acute colitis [K52.9]      Reason for Hospitalization:    Left lower quadrant abdominal pain      Hospital Course:   64-year-old lady with history of essential hypertension hyperlipidemia and recent oral antibiotic use presented to the ED with complaints of left lower quadrant pain. CT scan of the abdomen and pelvis showed colitis of the descending colon. Admitted for IV antibiotics and GI evaluation.  Symptoms improved over the course of hospitalization.  Will be switched from IV Zosyn to oral ciprofloxacin and Flagyl.  Complete additional 7-day treatment course with close follow-up with gastroenterologist outpatient for consideration of colonoscopy.  She is felt stable for discharge to home.  Stool for C. difficile negative        Discharge Medications:      Medication List        START taking these medications      ciprofloxacin 500 MG tablet  Commonly known as: CIPRO  Take 1 tablet by mouth 2 times daily for 7 days     metroNIDAZOLE 500 MG tablet  Commonly known as: FLAGYL  Take 1 tablet by mouth 2 times daily for 7 days            CONTINUE taking these medications      amLODIPine 5 MG tablet  Commonly known as: NORVASC     aspirin 81 MG chewable tablet     FLUoxetine 40 MG capsule  Commonly known as: PROzac     lisinopril-hydroCHLOROthiazide 20-25 MG per tablet  Commonly known as: PRINZIDE;ZESTORETIC     pediatric multivitamin chewable tablet     potassium chloride 10 MEQ extended release capsule  Commonly known as: MICRO-K     rosuvastatin 10 MG tablet  Commonly known as: CRESTOR               Where to Get Your Medications        These medications were sent to [facility]/pharmacy #[id number] [facility] [address]

## 2024-11-24 NOTE — CARE COORDINATION
Care Management Initial Assessment       RUR:  4%  Readmission? No  1st IM letter given? No  1st  letter given: No    Chart reviewed. Patient discharging home today.    CM met with patient to discuss discharge planning. States that her  will transport home. No concerns about obtaining ordered medications, when asked. No needs for CM identified.    Okay to dc from CM standpoint.      11/24/24 1206   Service Assessment   Patient Orientation Alert and Oriented   Cognition Alert   History Provided By Patient   Primary Caregiver Self   Support Systems Spouse/Significant Other  (Logan Juárez, )   Patient's Healthcare Decision Maker is: Patient Declined (Legal Next of Kin Remains as Decision Maker)   PCP Verified by CM Yes   Prior Functional Level Independent in ADLs/IADLs   Current Functional Level Independent in ADLs/IADLs   Can patient return to prior living arrangement Yes   Ability to make needs known: Good   Family able to assist with home care needs: Yes   Would you like for me to discuss the discharge plan with any other family members/significant others, and if so, who? Yes  (can discuss with , Logan Juárez, if needed)   Financial Resources Other (Comment)  (private health insurance)   Community Resources None   Social/Functional History   Lives With Spouse   Type of Home House   Home Equipment None   Active  Yes   Discharge Planning   Type of Residence House   Living Arrangements Spouse/Significant Other   Current Services Prior To Admission None   DME Ordered? No   Type of Home Care Services None   Patient expects to be discharged to: House   Services At/After Discharge   Transition of Care Consult (CM Consult) Discharge Planning   Services At/After Discharge None   [place] Resource Information Provided? No   Mode of Transport at Discharge Other (see comment)  ( will transport home in the car)   Confirm Follow Up Transport Family   Condition of Participation: Discharge

## 2025-06-18 ENCOUNTER — TRANSCRIBE ORDERS (OUTPATIENT)
Facility: HOSPITAL | Age: 65
End: 2025-06-18

## 2025-06-18 DIAGNOSIS — Z12.31 OTHER SCREENING MAMMOGRAM: Primary | ICD-10-CM

## 2025-06-19 ENCOUNTER — HOSPITAL ENCOUNTER (OUTPATIENT)
Facility: HOSPITAL | Age: 65
Discharge: HOME | End: 2025-06-22
Attending: OBSTETRICS & GYNECOLOGY
Payer: COMMERCIAL

## 2025-06-19 VITALS — HEIGHT: 58 IN | WEIGHT: 148 LBS | BODY MASS INDEX: 31.07 KG/M2

## 2025-06-19 DIAGNOSIS — Z12.31 OTHER SCREENING MAMMOGRAM: ICD-10-CM

## 2025-06-19 PROCEDURE — 77063 BREAST TOMOSYNTHESIS BI: CPT
